# Patient Record
Sex: FEMALE | Race: BLACK OR AFRICAN AMERICAN
[De-identification: names, ages, dates, MRNs, and addresses within clinical notes are randomized per-mention and may not be internally consistent; named-entity substitution may affect disease eponyms.]

---

## 2017-02-07 NOTE — PHYS DOC
Past Medical History


Past Medical History:  Migraines


Additional Past Medical Histor:  [place] palsy


Past Surgical History:  No Surgical History


Alcohol Use:  None


Drug Use:  Marijuana





Adult General


Chief Complaint


Chief Complaint:  Toothache





HPI


HPI


Patient is a 21  year old female who presents the emergency Department today 

with complaint of right-sided dental pain is been ongoing for approximately 5 

days. Patient does have a history of impacted wisdom teeth. She had been 

advised to have them removed. She did not get them removed due to pregnancy. 

Patient denies antibiotic use or recent dental procedures within the past 30 

days.





Review of Systems


Review of Systems





Constitutional: Denies fever or chills []


Eyes: Denies change in visual acuity, redness, or eye pain []


HENT: Denies nasal congestion or sore throat []


Respiratory: Denies cough or shortness of breath []


Cardiovascular: No additional information not addressed in HPI []


GI: Denies abdominal pain, nausea, vomiting, bloody stools or diarrhea []


: Denies dysuria or hematuria []


Musculoskeletal: Denies back pain or joint pain []


Integument: Denies rash or skin lesions []


Neurologic: Denies headache, focal weakness or sensory changes []


Endocrine: Denies polyuria or polydipsia []





Allergies


Allergies





 Allergies








Coded Allergies Type Severity Reaction Last Updated Verified


 


  No Known Drug Allergies    2/20/14 No











Physical Exam


Physical Exam





Constitutional: Well developed, well nourished, no acute distress, non-toxic 

appearance. []


HENT: Normocephalic, atraumatic, bilateral external ears normal, oropharynx 

moist, no oral exudates, nose normal. There is no trismus. Patient has a 

partially erupted right mandibular wisdom tooth. There is minimal gingival 

swelling without purulent drainage or gingival abscess.


Eyes: PERRLA, EOMI, conjunctiva normal, no discharge. [] 


Neck: Normal range of motion, no tenderness, supple, no stridor. [] 


Cardiovascular:Heart rate regular rhythm, no murmur []


Lungs & Thorax:  Bilateral breath sounds clear to auscultation []


Abdomen: Bowel sounds normal, soft, no tenderness, no masses, no pulsatile 

masses. [] 


Skin: Warm, dry, no erythema, no rash. [] 


Back: No tenderness, no CVA tenderness. [] 


Extremities: No tenderness, no cyanosis, no clubbing, ROM intact, no edema. [] 


Neurologic: Alert and oriented X 3, normal motor function, normal sensory 

function, no focal deficits noted. []


Psychologic: Affect normal, judgement normal, mood normal. []





Current Patient Data


Vital Signs





 Vital Signs








  Date Time  Temp Pulse Resp B/P Pulse Ox O2 Delivery O2 Flow Rate FiO2


 


2/7/17 11:31 98.1 90 18  99 Room Air  





 98.1       











EKG


EKG


[]





Radiology/Procedures


Radiology/Procedures


[]





Course & Med Decision Making


Course & Med Decision Making


Pertinent Labs and Imaging studies reviewed. (See chart for details)





[]





Dragon Disclaimer


Dragon Disclaimer


This electronic medical record was generated, in whole or in part, using a 

voice recognition dictation system.





Departure


Departure


Impression:  


 Primary Impression:  


 Pericoronitis


 Additional Impression:  


 Pain, dental


Disposition:  01 HOME, SELF-CARE


Condition:  GOOD


Referrals:  


NO PCP (PCP)


Patient Instructions:  Dental Pain, Easy-to-Read, Pericoronitis





Additional Instructions:


1. Take the medication as prescribed.


2. You need to follow-up with a dentist. Use the dental resource sheet provided 

for assistance in finding a dental clinic to address your dental care needs.


3. Review the discharge instructions for self-care and reasons to return to the 

emergency department.


Scripts


Hydrocodone/Apap 5-325 (Norco 5-325 Tablet)1 Each Tablet1 Tab PO PRN Q6HRS PRN 

PAIN #10 TAB  Ref 0


   Prov:DOLORES RATLIFF         2/7/17


Amoxicillin 500 Mg Tablet1 Tab PO TID #30 TAB


   Prov:DOLORES RATLIFF         2/7/17





Problem Qualifiers








DOLORES RATLIFF Feb 7, 2017 13:02

## 2017-03-25 NOTE — VNOTE
CALL BACK NOTE


CALL BACK





Microbiology


3/23/17 Wet Prep - Final, Complete


          


3/23/17 Urine Culture - Final, Complete


          


3/23/17 Urine Culture Result 1 (AUDRA) - Final, Complete


          


3/23/17 Urine Culture Result 2 (AUDRA) - Final, Complete


          


Attempted to contact patient in regards to urine being positive for group B 

strep. Patient is not pregnant at 1-2 provide this information for her to the 

future. There was no answer at the number provided by registration. Message was 

left for her to call the department for results.








RICKY MÉNDEZ Mar 25, 2017 14:48

## 2017-03-27 NOTE — VNOTE
CALL BACK NOTE


CALL BACK





Microbiology


3/23/17 Wet Prep - Final, Complete


          


3/23/17 Urine Culture - Final, Complete


          


3/23/17 Urine Culture Result 1 (AUDRA) - Final, Complete


          


3/23/17 Urine Culture Result 2 (AUDRA) - Final, Complete


          


Patient returned our phone call for lab results at 0923 today. Her urine was 

positive for group B strep with less than 100,000 CFU per mL. She is not 

pregnant, so this is not a significant finding. Her wet prep was also positive 

for decreased. She was not treated for this in the emergency department or with 

prescriptions. She uses the [facility] pharmacy at [facility] and [address] [place] [address]. A 

prescription was called for Diflucan 150 mg by mouth weekly, #2 pills. All the 

patient's questions were answered to her satisfaction.








PRERNA MAJOR Mar 27, 2017 16:20

## 2017-09-30 VITALS — DIASTOLIC BLOOD PRESSURE: 70 MMHG | SYSTOLIC BLOOD PRESSURE: 146 MMHG

## 2017-09-30 NOTE — RAD
Obstetrical ultrasound less than 14 weeks transabdominal and transvaginal 

imaging.

 

HISTORY: Pelvic pain

 

Transabdominal ultrasound was performed. There is an intrauterine 

gestation. 3.3 x 3 x 2 cm cyst in the left ovary suggesting a corpus 

lutein. There is flow in the left ovary with color imaging. Right ovary 

was not definitively identified transabdominally.

 

Transvaginal imaging was performed for further evaluation. There is a 

trace of free fluid in the cul-de-sac. There is an intrauterine gestation,

the crown-rump length was 3 mm corresponding to 5 weeks 6 days gestational

age. Heart rate was 106 bpm. Yolk sac was noted at 1.3 mm.

 

Right ovary was normal measuring 2.5 x 2.8 x 1.4 cm. There is a simple 

left ovarian cyst measuring 3.4 x 2.9 x 3.1 cm. The left ovary measured 

3.8 x 4.6 x 3 cm.

 

IMPRESSION:

1. Viable intrauterine pregnancy 5 weeks 6 days gestational age by 

ultrasound with an estimated date of delivery of 5/27/2018.

2. Left ovarian cyst.

 

Electronically signed by: Parveen Singh MD (9/30/2017 9:19 PM) [facility]

## 2017-09-30 NOTE — PHYS DOC
Past Medical History


Past Medical History:  Migraines


Additional Past Medical Histor:  [place] palsy


Past Surgical History:  No Surgical History


Alcohol Use:  None


Drug Use:  Marijuana





Adult General


Chief Complaint


Chief Complaint:  ABDOMINAL PAIN





HPI


HPI





Patient is a 22  year old female with no significant medical history who 

presents today with nausea and generalized abdominal pain for the last 1 week. 

Patient denies any vomiting. She states she has missed her cycle by one week. 

She believes she is pregnant because she has been trying to be pregnant. 

Patient denies any unusual vaginal discharge. Denies any concerns for STDs.





Review of Systems


Review of Systems





Constitutional: Denies fever or chills []


Eyes: Denies change in visual acuity, redness, or eye pain []


HENT: Denies nasal congestion or sore throat []


Respiratory: Denies cough or shortness of breath []


Cardiovascular: No additional information not addressed in HPI []


GI: Generalized abdominal pain with nausea, denies any vomiting or diarrhea


: Denies dysuria or hematuria []


Musculoskeletal: Denies back pain or joint pain []


Integument: Denies rash or skin lesions []


Neurologic: Denies headache, focal weakness or sensory changes []





Current Medications


Current Medications





Current Medications








 Medications


  (Trade)  Dose


 Ordered  Sig/Devan  Start Time


 Stop Time Status Last Admin


Dose Admin


 


 Ondansetron HCl


  (Zofran)  4 mg  1X  ONCE  17 18:30


 17 18:31 DC 17 18:37


4 MG


 


 Sodium Chloride  1,000 ml @ 


 1,000 mls/hr  1X  ONCE  17 18:30


 17 19:29 DC 17 18:37


1,000 MLS/HR











Allergies


Allergies





Allergies








Coded Allergies Type Severity Reaction Last Updated Verified


 


  No Known Drug Allergies    14 No











Physical Exam


Physical Exam





Constitutional: Well developed, well nourished, no acute distress, non-toxic 

appearance. []


HENT: Normocephalic, atraumatic, bilateral external ears normal, oropharynx 

moist, no oral exudates, nose normal. []


Eyes: PERRLA, EOMI, conjunctiva normal, no discharge. [] 


Neck: Normal range of motion, no tenderness, supple, no stridor. [] 


Cardiovascular:Heart rate regular rhythm, no murmur []


Lungs & Thorax:  Bilateral breath sounds clear to auscultation []


Abdomen: Bowel sounds normal, soft, no tenderness, no masses, no pulsatile 

masses. [] 


Skin: Warm, dry, no erythema, no rash. [] 


Back: No tenderness, no CVA tenderness. [] 


Extremities: No tenderness, no cyanosis, no clubbing, ROM intact, no edema. [] 


Neurologic: Alert and oriented X 3, normal motor function, normal sensory 

function, no focal deficits noted. []


Psychologic: Affect normal, judgement normal, mood normal. []





Current Patient Data


Vital Signs





 Vital Signs








  Date Time  Temp Pulse Resp B/P (MAP) Pulse Ox O2 Delivery O2 Flow Rate FiO2


 


17 20:30  86 18 152/74 (100) 99 Room Air  


 


17 17:55 98.2       





 98.2       








Lab Values





 Laboratory Tests








Test


  17


18:08 17


18:10


 


POC Urine HCG, Qualitative


  Hcg positive


(Negative) 


 


 


White Blood Count


  


  6.2 x10^3/uL


(4.0-11.0)


 


Red Blood Count


  


  4.77 x10^6/uL


(3.50-5.40)


 


Hemoglobin


  


  13.6 g/dL


(12.0-15.5)


 


Hematocrit


  


  41.6 %


(36.0-47.0)


 


Mean Corpuscular Volume


  


  87 fL ()


 


 


Mean Corpuscular Hemoglobin  29 pg (25-35)  


 


Mean Corpuscular Hemoglobin


Concent 


  33 g/dL


(31-37)


 


Red Cell Distribution Width


  


  13.7 %


(11.5-14.5)


 


Platelet Count


  


  247 x10^3/uL


(140-400)


 


Neutrophils (%) (Auto)  50 % (31-73)  


 


Lymphocytes (%) (Auto)  38 % (24-48)  


 


Monocytes (%) (Auto)  9 % (0-9)  


 


Eosinophils (%) (Auto)  2 % (0-3)  


 


Basophils (%) (Auto)  1 % (0-3)  


 


Neutrophils # (Auto)


  


  3.1 x10^3uL


(1.8-7.7)


 


Lymphocytes # (Auto)


  


  2.4 x10^3/uL


(1.0-4.8)


 


Monocytes # (Auto)


  


  0.6 x10^3/uL


(0.0-1.1)


 


Eosinophils # (Auto)


  


  0.1 x10^3/uL


(0.0-0.7)


 


Basophils # (Auto)


  


  0.0 x10^3/uL


(0.0-0.2)


 


Urine Collection Type  Unknown  


 


Urine Color  Yellow  


 


Urine Clarity  Clear  


 


Urine pH  5.5  


 


Urine Specific Gravity  1.020  


 


Urine Protein


  


  Negative mg/dL


(NEG-TRACE)


 


Urine Glucose (UA)


  


  Negative mg/dL


(NEG)


 


Urine Ketones (Stick)


  


  15 mg/dL (NEG)


 


 


Urine Blood


  


  Negative (NEG)


 


 


Urine Nitrite


  


  Negative (NEG)


 


 


Urine Bilirubin


  


  Negative (NEG)


 


 


Urine Urobilinogen Dipstick


  


  0.2 mg/dL (0.2


mg/dL)


 


Urine Leukocyte Esterase


  


  Negative (NEG)


 


 


Urine RBC  0 /HPF (0-2)  


 


Urine WBC


  


  1-4 /HPF (0-4)


 


 


Urine Squamous Epithelial


Cells 


  Mod /LPF  


 


 


Urine Bacteria


  


  0 /HPF (0-FEW)


 


 


Urine Mucus  Mod /LPF  


 


Maternal Serum HCG Beta


Subunit 


  8391 mIU/mL


(0-5)  H


 


Sodium Level


  


  136 mmol/L


(136-145)


 


Potassium Level


  


  3.3 mmol/L


(3.5-5.1)  L


 


Chloride Level


  


  101 mmol/L


()


 


Carbon Dioxide Level


  


  26 mmol/L


(21-32)


 


Anion Gap  9 (6-14)  


 


Blood Urea Nitrogen


  


  8 mg/dL (7-20)


 


 


Creatinine


  


  0.8 mg/dL


(0.6-1.0)


 


Estimated GFR


(Cockcroft-Gault) 


  109.6  


 


 


BUN/Creatinine Ratio  10 (6-20)  


 


Glucose Level


  


  79 mg/dL


(70-99)


 


Calcium Level


  


  8.9 mg/dL


(8.5-10.1)


 


Total Bilirubin


  


  0.6 mg/dL


(0.2-1.0)


 


Aspartate Amino Transferase


(AST) 


  20 U/L (15-37)


 


 


Alanine Aminotransferase (ALT)


  


  23 U/L (14-59)


 


 


Alkaline Phosphatase


  


  66 U/L


()


 


Total Protein


  


  7.7 g/dL


(6.4-8.2)


 


Albumin


  


  3.6 g/dL


(3.4-5.0)


 


Albumin/Globulin Ratio


  


  0.9 (1.0-1.7)


L


 


Lipase


  


  118 U/L


()





 Laboratory Tests


17 18:10








 Laboratory Tests


17 18:10














EKG


EKG


[]





Radiology/Procedures


Radiology/Procedures


[]





Course & Med Decision Making


Course & Med Decision Making


Pertinent Labs and Imaging studies reviewed. (See chart for details)





This is a 22-year-old female patient presented today with generalized abdominal 

pain and nausea for one week. She is concerned she could be pregnant, she has 

been trying to be pregnant. She has missed her cycle by one week. Urine 

analysis is negative for infection.





Positive urine hCG. Beta hCG 8391. CBC CMP lipase with no acute findings. OB 

ultrasound positive for IUP with the station or age of 5 weeks 6 days. She is a 

 2 para 1





Patient was discharged with instructions to follow-up with an OB/GYN as soon as 

she can. Discharged with Compazine as needed for nausea vomiting. Provided 

return precautions and discharged in stable condition. Prenatal vitamins 

recommended.





Dragon Disclaimer


Dragon Disclaimer


This electronic medical record was generated, in whole or in part, using a 

voice recognition dictation system.





Departure


Departure


Impression:  


 Primary Impression:  


 Abdominal pain affecting pregnancy


 Additional Impressions:  


 Pregnancy


 Morning sickness


Disposition:   HOME, SELF-CARE


Condition:  STABLE


Referrals:  


NO PCP (PCP)








SEAN TEMPLE Jr, MD


follow up next week


Patient Instructions:  ABCs of Pregnancy, Abdominal Pain During Pregnancy, 

Morning Sickness, Easy-to-Read





Additional Instructions:  


Congratulations you are pregnant. Please take prenatal vitamins every day. 

Follow-up with an OB/GYN as soon as you can. Come back to the ED at any point 

symptoms worsen.


Scripts


Prochlorperazine Maleate (Compazine) 10 Mg Tablet


10 MG PO Q8HRS for NAUSEA, #30 TAB


   Prov: KERRY KABA         17





Problem Qualifiers








 Additional Impressions:  


 Pregnancy


 Weeks of gestation:  less than 8 weeks  Qualified Codes:  Z3A.01 - Less than 8 

weeks gestation of pregnancy








KERRY KABA Sep 30, 2017 18:22

## 2017-11-08 ENCOUNTER — HOSPITAL ENCOUNTER (OUTPATIENT)
Dept: HOSPITAL 61 - US | Age: 22
Discharge: HOME | End: 2017-11-08
Attending: OBSTETRICS & GYNECOLOGY
Payer: COMMERCIAL

## 2017-11-08 DIAGNOSIS — Z3A.09: ICD-10-CM

## 2017-11-08 DIAGNOSIS — O20.0: ICD-10-CM

## 2017-11-08 DIAGNOSIS — O09.91: Primary | ICD-10-CM

## 2017-11-08 PROCEDURE — 76817 TRANSVAGINAL US OBSTETRIC: CPT

## 2017-11-08 PROCEDURE — 76801 OB US < 14 WKS SINGLE FETUS: CPT

## 2017-11-08 NOTE — RAD
EXAM: Obstructive ultrasound.



HISTORY: No fetal heart tones.



COMPARISON: None.



FINDINGS: Sonographic evaluation of the uterus and fetus was performed

transabdominally and transvaginally.



The uterus is anteverted and measures 11.0 x 7.7 x 6.4 cm. There is a single

intrauterine gestation measuring 9 weeks 0 days. No fetal heart motion is

detected consistent with fetal demise. There is no subchorionic collection.

The cervix is closed.



The right ovary measures 2.7 x 2.4 x 1.3 cm. The left measures 4.0 x 3.1 x 1.7

cm. There is normal flow bilaterally.



IMPRESSION:

1. No fetal heart motion consistent with fetal demise.



These findings were communicated to Dr. Castillo by the technologist at the time

of the study.

## 2017-11-14 ENCOUNTER — HOSPITAL ENCOUNTER (EMERGENCY)
Dept: HOSPITAL 61 - ER | Age: 22
LOS: 1 days | Discharge: HOME | End: 2017-11-15
Payer: COMMERCIAL

## 2017-11-14 VITALS — WEIGHT: 228 LBS | HEIGHT: 62 IN | BODY MASS INDEX: 41.96 KG/M2

## 2017-11-14 DIAGNOSIS — O03.9: Primary | ICD-10-CM

## 2017-11-14 DIAGNOSIS — O24.911: ICD-10-CM

## 2017-11-14 DIAGNOSIS — O16.1: ICD-10-CM

## 2017-11-14 LAB
ANION GAP SERPL CALC-SCNC: 7 MMOL/L (ref 6–14)
BASOPHILS # BLD AUTO: 0 X10^3/UL (ref 0–0.2)
BASOPHILS NFR BLD: 0 % (ref 0–3)
BUN SERPL-MCNC: 9 MG/DL (ref 7–20)
BUN/CREAT SERPL: 11 (ref 6–20)
CALCIUM SERPL-MCNC: 8.8 MG/DL (ref 8.5–10.1)
CHLORIDE SERPL-SCNC: 106 MMOL/L (ref 98–107)
CO2 SERPL-SCNC: 28 MMOL/L (ref 21–32)
CREAT SERPL-MCNC: 0.8 MG/DL (ref 0.6–1)
EOSINOPHIL NFR BLD: 2 % (ref 0–3)
ERYTHROCYTE [DISTWIDTH] IN BLOOD BY AUTOMATED COUNT: 13.6 % (ref 11.5–14.5)
GFR SERPLBLD BASED ON 1.73 SQ M-ARVRAT: 109.6 ML/MIN
GLUCOSE SERPL-MCNC: 85 MG/DL (ref 70–99)
HCT VFR BLD CALC: 38.8 % (ref 36–47)
HGB BLD-MCNC: 12.9 G/DL (ref 12–15.5)
LYMPHOCYTES # BLD: 2 X10^3/UL (ref 1–4.8)
LYMPHOCYTES NFR BLD AUTO: 27 % (ref 24–48)
MCH RBC QN AUTO: 29 PG (ref 25–35)
MCHC RBC AUTO-ENTMCNC: 33 G/DL (ref 31–37)
MCV RBC AUTO: 87 FL (ref 79–100)
MONOCYTES NFR BLD: 11 % (ref 0–9)
NEUTROPHILS NFR BLD AUTO: 60 % (ref 31–73)
PLATELET # BLD AUTO: 249 X10^3/UL (ref 140–400)
POTASSIUM SERPL-SCNC: 4.2 MMOL/L (ref 3.5–5.1)
RBC # BLD AUTO: 4.45 X10^6/UL (ref 3.5–5.4)
SODIUM SERPL-SCNC: 141 MMOL/L (ref 136–145)
WBC # BLD AUTO: 7.5 X10^3/UL (ref 4–11)

## 2017-11-14 PROCEDURE — 84702 CHORIONIC GONADOTROPIN TEST: CPT

## 2017-11-14 PROCEDURE — 80053 COMPREHEN METABOLIC PANEL: CPT

## 2017-11-14 PROCEDURE — 87591 N.GONORRHOEAE DNA AMP PROB: CPT

## 2017-11-14 PROCEDURE — 87086 URINE CULTURE/COLONY COUNT: CPT

## 2017-11-14 PROCEDURE — 76801 OB US < 14 WKS SINGLE FETUS: CPT

## 2017-11-14 PROCEDURE — 96375 TX/PRO/DX INJ NEW DRUG ADDON: CPT

## 2017-11-14 PROCEDURE — 36415 COLL VENOUS BLD VENIPUNCTURE: CPT

## 2017-11-14 PROCEDURE — 96374 THER/PROPH/DIAG INJ IV PUSH: CPT

## 2017-11-14 PROCEDURE — 87491 CHLMYD TRACH DNA AMP PROBE: CPT

## 2017-11-14 PROCEDURE — 81001 URINALYSIS AUTO W/SCOPE: CPT

## 2017-11-14 PROCEDURE — 96361 HYDRATE IV INFUSION ADD-ON: CPT

## 2017-11-14 PROCEDURE — 85025 COMPLETE CBC W/AUTO DIFF WBC: CPT

## 2017-11-14 PROCEDURE — 74177 CT ABD & PELVIS W/CONTRAST: CPT

## 2017-11-14 PROCEDURE — 99285 EMERGENCY DEPT VISIT HI MDM: CPT

## 2017-11-15 VITALS — SYSTOLIC BLOOD PRESSURE: 122 MMHG | DIASTOLIC BLOOD PRESSURE: 66 MMHG

## 2017-11-15 LAB
ALBUMIN SERPL-MCNC: 3.2 G/DL (ref 3.4–5)
ALBUMIN/GLOB SERPL: 0.8 {RATIO} (ref 1–1.7)
ALP SERPL-CCNC: 72 U/L (ref 46–116)
ALT SERPL-CCNC: 28 U/L (ref 14–59)
AST SERPL-CCNC: 26 U/L (ref 15–37)
BACTERIA #/AREA URNS HPF: (no result) /HPF
BILIRUB SERPL-MCNC: 0.5 MG/DL (ref 0.2–1)
BILIRUB UR QL STRIP: NEGATIVE
GLOBULIN SER-MCNC: 3.9 G/DL (ref 2.2–3.8)
GLUCOSE UR STRIP-MCNC: NEGATIVE MG/DL
NITRITE UR QL STRIP: NEGATIVE
PH UR STRIP: 6 [PH]
PROT SERPL-MCNC: 7.1 G/DL (ref 6.4–8.2)
PROT UR STRIP-MCNC: 30 MG/DL
RBC #/AREA URNS HPF: (no result) /HPF (ref 0–2)
SP GR UR STRIP: >=1.03
SQUAMOUS #/AREA URNS LPF: (no result) /LPF
UROBILINOGEN UR-MCNC: 0.2 MG/DL
WBC #/AREA URNS HPF: (no result) /HPF (ref 0–4)

## 2017-11-15 NOTE — PHYS DOC
Past Medical History


Past Medical History:  Other


Additional Past Medical Histor:  Bell's Palsy, HTN DURING PREGNANCY


Past Surgical History:  Other


Additional Past Surgical Histo:  D&C


Alcohol Use:  None


Drug Use:  None





Adult General


Chief Complaint


Chief Complaint:  VAGINAL BLEEDING





HPI


HPI





Patient is a 22  year old female who is  2 para 1 presents here today 

secondary to vaginal bleeding and abdominal cramping. Patient denies any fevers 

shakes chills nausea vomiting or diarrhea. Patient complaining of excessive 

vaginal bleeding and abdominal cramping. Patient reports she had a DNE on 

Friday and was told that if she started having increased bleeding or pain to 

come to the ER. Patient reports this evening started noticing increased 

bleeding that has gone through 2 pads over the last several hours. Patient has 

any cough. Patient has a dysuria frequency or urgency. Patient has any diabetes 

liver lung or kidney problems. Patient's last normal period. 2017.





Review of systems:


Constitutional: Denies fever or chills 


Eyes: Denies change in visual acuity, redness, or eye pain 


HENT: Denies nasal congestion or sore throat 





All other systems were reviewed and found to be within normal limits, except as 

documented in this note.





Physical exam:


Constitutional: Well developed, well nourished, no acute distress, non-toxic 

appearance. 


HENT: Normocephalic, atraumatic, bilateral external ears normal


Eyes: PERRLA, EOMI, conjunctiva normal, no discharge.  


Neck: Normal range of motion, no tenderness, supple, no stridor.  


Cardiovascular:Heart rate regular rhythm


Lungs & Thorax:  Bilateral breath sounds clear to auscultation 


Abdomen: Bowel sounds normal, soft, no tenderness, no masses, no pulsatile 

masses.  


Skin: Warm, dry, no erythema, no rash.  


Back: No tenderness, no CVA tenderness.  


Extremities: No tenderness, no cyanosis, no clubbing, ROM intact, no edema.  


Neurologic: Alert and oriented X 3, normal motor function, normal sensory 

function, no focal deficits noted. 


Psychologic: Affect normal, judgement normal, mood normal. 


Pelvic exam reveals blood cervical fault. Os is closed. No cervical motion 

tenderness. No adnexal masses. No excessive amount of bleeding noted from the 

cervix.








Assessment and plan:





Ultrasound reveals no acute pathology. No evidence of retained products of 

conception. There is evidence of blood within the endometrium.


CAT scan revealed no acute pathology. Appendix appeared normal. Enlarged 

endometrium with what appears to be blood within the endometrium.





22year-old female who presents here today with vaginal bleeding likely 

secondary to her recent miscarriage. Patient's clinically hemodynamically 

stable. Patient had a CBC and a CMP done which were all within normal limits. 

Patient's ultrasound and CT scan did not reveal any acute pathology. Patient's 

pelvic exam was unremarkable. Patient was given adequate analgesia and 

hydration and feels much better. Patient be discharged home with instructions 

follow-up with her OB/GYN doctor in the morning for reevaluation and 

reassessment.





Current Medications


Current Medications





Current Medications








 Medications


  (Trade)  Dose


 Ordered  Sig/Devan  Start Time


 Stop Time Status Last Admin


Dose Admin


 


 Hydromorphone HCl


  (Dilaudid)  0.5 mg  1X  ONCE  17 23:00


 17 23:01 DC 17 22:54


0.5 MG


 


 Info


  (Do NOT chart on


 this entry -- for


 MONITORING)  1 each  PRN DAILY  PRN  17 23:00


 17 22:59   


 


 


 Iohexol


  (Omnipaque 300


 Mg/ml)  75 ml  1X  ONCE  17 23:00


 17 23:01 DC 17 00:15


75 ML


 


 Ketorolac


 Tromethamine


  (Toradol)  15 mg  1X  ONCE  17 23:00


 17 23:01 DC 17 22:54


15 MG


 


 Ondansetron HCl


  (Zofran)  4 mg  1X  ONCE  17 23:00


 17 23:01 DC 17 22:54


4 MG


 


 Sodium Chloride  1,000 ml @ 


 1,000 mls/hr  1X  ONCE  17 23:00


 17 23:59 DC 17 22:55


1,000 MLS/HR











Allergies


Allergies





Allergies








Coded Allergies Type Severity Reaction Last Updated Verified


 


  No Known Drug Allergies    17 No











Current Patient Data


Vital Signs





 Vital Signs








  Date Time  Temp Pulse Resp B/P (MAP) Pulse Ox O2 Delivery O2 Flow Rate FiO2


 


11/15/17 00:30  85 22 122/66 (84) 97 Room Air  


 


17 22:20 98.9       





 98.9       








Lab Values





 Laboratory Tests








Test


  17


23:23 11/15/17


00:25


 


White Blood Count


  7.5 x10^3/uL


(4.0-11.0) 


 


 


Red Blood Count


  4.45 x10^6/uL


(3.50-5.40) 


 


 


Hemoglobin


  12.9 g/dL


(12.0-15.5) 


 


 


Hematocrit


  38.8 %


(36.0-47.0) 


 


 


Mean Corpuscular Volume


  87 fL ()


  


 


 


Mean Corpuscular Hemoglobin 29 pg (25-35)   


 


Mean Corpuscular Hemoglobin


Concent 33 g/dL


(31-37) 


 


 


Red Cell Distribution Width


  13.6 %


(11.5-14.5) 


 


 


Platelet Count


  249 x10^3/uL


(140-400) 


 


 


Neutrophils (%) (Auto) 60 % (31-73)   


 


Lymphocytes (%) (Auto) 27 % (24-48)   


 


Monocytes (%) (Auto) 11 % (0-9)  H 


 


Eosinophils (%) (Auto) 2 % (0-3)   


 


Basophils (%) (Auto) 0 % (0-3)   


 


Neutrophils # (Auto)


  4.5 x10^3uL


(1.8-7.7) 


 


 


Lymphocytes # (Auto)


  2.0 x10^3/uL


(1.0-4.8) 


 


 


Monocytes # (Auto)


  0.8 x10^3/uL


(0.0-1.1) 


 


 


Eosinophils # (Auto)


  0.2 x10^3/uL


(0.0-0.7) 


 


 


Basophils # (Auto)


  0.0 x10^3/uL


(0.0-0.2) 


 


 


Maternal Serum HCG Beta


Subunit 82 mIU/mL


(0-5)  H 


 


 


Sodium Level


  141 mmol/L


(136-145) 


 


 


Potassium Level


  4.2 mmol/L


(3.5-5.1) 


 


 


Chloride Level


  106 mmol/L


() 


 


 


Carbon Dioxide Level


  28 mmol/L


(21-32) 


 


 


Anion Gap 7 (6-14)   


 


Blood Urea Nitrogen


  9 mg/dL (7-20)


  


 


 


Creatinine


  0.8 mg/dL


(0.6-1.0) 


 


 


Estimated GFR


(Cockcroft-Gault) 109.6  


  


 


 


BUN/Creatinine Ratio 11 (6-20)   


 


Glucose Level


  85 mg/dL


(70-99) 


 


 


Calcium Level


  8.8 mg/dL


(8.5-10.1) 


 


 


Total Bilirubin


  0.5 mg/dL


(0.2-1.0) 


 


 


Aspartate Amino Transferase


(AST) 26 U/L (15-37)


  


 


 


Alanine Aminotransferase (ALT)


  28 U/L (14-59)


  


 


 


Alkaline Phosphatase


  72 U/L


() 


 


 


Total Protein


  7.1 g/dL


(6.4-8.2) 


 


 


Albumin


  3.2 g/dL


(3.4-5.0)  L 


 


 


Albumin/Globulin Ratio


  0.8 (1.0-1.7)


L 


 


 


Urine Collection Type  Unknown  


 


Urine Color  Red  


 


Urine Clarity  Turbid  


 


Urine pH  6.0  


 


Urine Specific Gravity  >=1.030  


 


Urine Protein


  


  30 mg/dL


(NEG-TRACE)


 


Urine Glucose (UA)


  


  Negative mg/dL


(NEG)


 


Urine Ketones (Stick)


  


  Trace mg/dL


(NEG)


 


Urine Blood  Large (NEG)  


 


Urine Nitrite


  


  Negative (NEG)


 


 


Urine Bilirubin


  


  Negative (NEG)


 


 


Urine Urobilinogen Dipstick


  


  0.2 mg/dL (0.2


mg/dL)


 


Urine Leukocyte Esterase  Small (NEG)  


 


Urine RBC


  


  Tntc /HPF


(0-2)


 


Urine WBC


  


  5-10 /HPF


(0-4)


 


Urine Squamous Epithelial


Cells 


  Mod /LPF  


 


 


Urine Bacteria


  


  Few /HPF


(0-FEW)


 


Urine Mucus  Mod /LPF  





 Laboratory Tests


17 23:23








 Laboratory Tests


17 23:23











Microbiology


11/15/17 Wet Prep - Final, Complete


           








EKG


EKG


[]





Radiology/Procedures


Radiology/Procedures


[]





Course & Med Decision Making


Course & Med Decision Making


Pertinent Labs and Imaging studies reviewed. (See chart for details)





[]





Dragon Disclaimer


Dragon Disclaimer


This electronic medical record was generated, in whole or in part, using a 

voice recognition dictation system.





Departure


Departure


Impression:  


 Primary Impression:  


 Vaginal bleeding


 Additional Impression:  


 Miscarriage


Disposition:  01 HOME, SELF-CARE


Condition:  IMPROVED


Referrals:  


NO PCP (PCP)


Patient Instructions:  Dilation and Curettage or Vacuum Curettage, Care After


Scripts


Tramadol Hcl (ULTRAM) 50 Mg Tablet


1 TAB PO Q6HRS, #14 TAB


   Prov: ANNABELLE PERSON MD         11/15/17 


Ibuprofen (IBUPROFEN) 600 Mg Tablet


600 MG PO PRN Q6HRS Y for PAIN, #20 TAB


   Prov: ANNABELLE PERSON MD         11/15/17





Problem Qualifiers











ANNABELLE PERSON MD Nov 15, 2017 01:34

## 2017-11-15 NOTE — RAD
PQRS Compliance Statement:

 

One or more of the following individualized dose reduction techniques were

utilized for this examination:  

1. Automated exposure control  

2. Adjustment of the mA and/or kV according to patient size  

3. Use of iterative reconstruction technique

 

CT abdomen/pelvis with contrast November 14, 2017

 

INDICATION: Vaginal bleeding after D&C.

 

COMPARISON: None available

 

TECHNIQUE: Multiple axial CT images of the abdomen and pelvis were 

obtained after the intravenous administration of 75 cc Omnipaque 300. 

Coronal and sagittal reformats are provided.

 

FINDINGS:

 

Lung bases are clear. Heart size is within normal limits.

 

The liver is homogeneous in enhancement without evidence for a focal mass 

lesion. The spleen is nonenlarged. Bilateral adrenal glands are normal in 

appearance. Pancreas is normal. Gallbladder is present without adjacent 

inflammatory changes.

 

Abdominal aorta is normal in course and caliber. There are no 

pathologically enlarged lymph nodes in abdomen or pelvis. There is no free

fluid or free intraperitoneal air.

 

Kidneys enhance symmetrically. No suspicious renal mass is identified. 

There is no hydronephrosis. No calculi are identified in the ureters or 

urinary bladder. Urinary bladder is within normal limits given degree of 

distention.

 

Small and large bowel are normal in caliber without evidence for bowel 

obstruction. No pericolonic inflammatory changes are identified. The 

appendix is nonenlarged measuring 5 mm in diameter. No adjacent 

inflammatory changes are present.

 

Endometrium appears thickened measuring approximately 3.5 cm. Adnexa 

appear normal. Trace free fluid within the pelvis may be physiologic.

 

No suspicious osseous lesions are identified.

 

IMPRESSION:

1. No evidence for bowel obstruction or inflammation. A nondilated 

appendix is noted without significant adjacent inflammatory changes.

2. Endometrium appears thickened measuring up to 3.5 cm. Findings are 

nonspecific, however consideration may be given for blood products. Pelvic

ultrasound may be of benefit to assess for any vascularity to suggest 

underlying arteriovenous malformation.

 

Electronically signed by: Merced Quintero MD (11/15/2017 12:27 AM) 

[facility]

## 2017-11-15 NOTE — RAD
Obstetric pelvic ultrasound November 15, 2017

 

INDICATION: History of fetal demise 11/8/2017 status post D&C on 

11/10/2017. Beta-hCG 82

 

COMPARISON: CT abdomen/pelvis November 14, 2017

 

TECHNIQUE: Sonographic imaging of the pelvis was performed utilizing 

transabdominal imaging.

 

FINDINGS: 

 

The uterus measures 10 x 7.2 x 6.3 cm. The endometrium contains hypoechoic

debris measuring approximately 4.3 cm in transverse dimension. No 

suspicious vascularity is identified within the endometrium.

 

The right ovary measures 2.9 x 1.6 x 1.5 cm. Arterial and venous waveform 

is noted at the time of imaging. The left ovary measures 1.8 x 3.6 x 1.8 

cm. Arterial and venous waveform is noted at the time of imaging. Ovaries 

are normal in appearance.

 

There is no free fluid within the pelvis.

 

IMPRESSION:

 

Distended endometrium containing debris most suggestive of blood products.

Findings are less likely to represent retained products of conception 

given the lack of vascularity, however persistent elevation of beta hCG 

remains suspicious. Short-term follow-up pelvic ultrasound and serial beta

hCG is recommended. There is no definite evidence for a vascular 

malformation.

 

Electronically signed by: Merced Quintero MD (11/15/2017 1:17 AM) 

[facility]

## 2018-01-13 ENCOUNTER — HOSPITAL ENCOUNTER (EMERGENCY)
Dept: HOSPITAL 61 - ER | Age: 23
Discharge: HOME | End: 2018-01-13
Payer: COMMERCIAL

## 2018-01-13 DIAGNOSIS — O99.351: ICD-10-CM

## 2018-01-13 DIAGNOSIS — O99.511: Primary | ICD-10-CM

## 2018-01-13 DIAGNOSIS — G51.0: ICD-10-CM

## 2018-01-13 DIAGNOSIS — J02.9: ICD-10-CM

## 2018-01-13 DIAGNOSIS — Z3A.01: ICD-10-CM

## 2018-01-13 PROCEDURE — 99283 EMERGENCY DEPT VISIT LOW MDM: CPT

## 2018-01-26 ENCOUNTER — HOSPITAL ENCOUNTER (EMERGENCY)
Dept: HOSPITAL 61 - ER | Age: 23
Discharge: HOME | End: 2018-01-26
Payer: COMMERCIAL

## 2018-01-26 DIAGNOSIS — Z3A.11: ICD-10-CM

## 2018-01-26 DIAGNOSIS — R19.7: ICD-10-CM

## 2018-01-26 DIAGNOSIS — O21.9: ICD-10-CM

## 2018-01-26 DIAGNOSIS — O20.0: Primary | ICD-10-CM

## 2018-01-26 DIAGNOSIS — O23.41: ICD-10-CM

## 2018-01-26 LAB
% ATYL: 1 % (ref 0–0)
% BANDS: 3 % (ref 0–9)
% LYMPHS: 4 % (ref 24–48)
% MONOS: 6 % (ref 0–10)
% SEGS: 86 % (ref 35–66)
ADD MAN DIFF?: YES
ALBUMIN SERPL-MCNC: 3.7 G/DL (ref 3.4–5)
ALBUMIN/GLOB SERPL: 0.8 {RATIO} (ref 1–1.7)
ALP SERPL-CCNC: 66 U/L (ref 46–116)
ALT (SGPT): 18 U/L (ref 14–59)
AMORPHOUS SEDIMENT,UR: PRESENT /HPF
ANION GAP SERPL CALC-SCNC: 12 MMOL/L (ref 6–14)
AST SERPL-CCNC: 16 U/L (ref 15–37)
BACTERIA,URINE: (no result) /HPF
BASO #: 0 X10^3/UL (ref 0–0.2)
BASO %: 0 % (ref 0–3)
BILIRUBIN,URINE: (no result)
BLOOD UREA NITROGEN: 7 MG/DL (ref 7–20)
BUN/CREAT SERPL: 10 (ref 6–20)
CALCIUM: 9.5 MG/DL (ref 8.5–10.1)
CHLORIDE: 97 MMOL/L (ref 98–107)
CLARITY,URINE: (no result)
CO2 SERPL-SCNC: 24 MMOL/L (ref 21–32)
COLOR,URINE: (no result)
CREAT SERPL-MCNC: 0.7 MG/DL (ref 0.6–1)
EOS #: 0 X10^3/UL (ref 0–0.7)
EOS %: 0 % (ref 0–3)
GFR SERPLBLD BASED ON 1.73 SQ M-ARVRAT: 127.8 ML/MIN
GLOBULIN SER-MCNC: 4.7 G/DL (ref 2.2–3.8)
GLUCOSE SERPL-MCNC: 108 MG/DL (ref 70–99)
GLUCOSE,URINE: NEGATIVE MG/DL
HCG SERPL-ACNC: 7.4 X10^3/UL (ref 4–11)
HEMATOCRIT: 44.3 % (ref 36–47)
HEMOGLOBIN: 15.3 G/DL (ref 12–15.5)
LYMPH #: 0.3 X10^3/UL (ref 1–4.8)
LYMPH %: 5 % (ref 24–48)
MEAN CORPUSCULAR HEMOGLOBIN: 29 PG (ref 25–35)
MEAN CORPUSCULAR HGB CONC: 35 G/DL (ref 31–37)
MEAN CORPUSCULAR VOLUME: 85 FL (ref 79–100)
MONO #: 0.3 X10^3/UL (ref 0–1.1)
MONO %: 4 % (ref 0–9)
NEUT #: 6.7 X10^3UL (ref 1.8–7.7)
NEUT %: 91 % (ref 31–73)
NITRITE,URINE: NEGATIVE
PH,URINE: 5.5
PLATELET COUNT: 290 X10^3/UL (ref 140–400)
PLT ESTIMATE: ADEQUATE
POTASSIUM SERPL-SCNC: 3.9 MMOL/L (ref 3.5–5.1)
PROTEIN,URINE: 30 MG/DL
RED BLOOD COUNT: 5.19 X10^6/UL (ref 3.5–5.4)
RED CELL DISTRIBUTION WIDTH: 13.2 % (ref 11.5–14.5)
SODIUM: 133 MMOL/L (ref 136–145)
SPECIFIC GRAVITY,URINE: >=1.03
SQUAMOUS EPITHELIAL CELL,UR: (no result) /LPF
TOTAL BILIRUBIN: 1.3 MG/DL (ref 0.2–1)
TOTAL PROTEIN: 8.4 G/DL (ref 6.4–8.2)
URINE HCG POC: (no result)
UROBILINOGEN,URINE: 1 MG/DL
WBC,URINE: (no result) /HPF (ref 0–4)

## 2018-01-26 PROCEDURE — 85025 COMPLETE CBC W/AUTO DIFF WBC: CPT

## 2018-01-26 PROCEDURE — 81025 URINE PREGNANCY TEST: CPT

## 2018-01-26 PROCEDURE — 99285 EMERGENCY DEPT VISIT HI MDM: CPT

## 2018-01-26 PROCEDURE — 76801 OB US < 14 WKS SINGLE FETUS: CPT

## 2018-01-26 PROCEDURE — 36415 COLL VENOUS BLD VENIPUNCTURE: CPT

## 2018-01-26 PROCEDURE — 80053 COMPREHEN METABOLIC PANEL: CPT

## 2018-01-26 PROCEDURE — 96374 THER/PROPH/DIAG INJ IV PUSH: CPT

## 2018-01-26 PROCEDURE — 85007 BL SMEAR W/DIFF WBC COUNT: CPT

## 2018-01-26 PROCEDURE — 87086 URINE CULTURE/COLONY COUNT: CPT

## 2018-01-26 PROCEDURE — 81001 URINALYSIS AUTO W/SCOPE: CPT

## 2018-01-26 PROCEDURE — 84702 CHORIONIC GONADOTROPIN TEST: CPT

## 2018-01-26 PROCEDURE — 96361 HYDRATE IV INFUSION ADD-ON: CPT

## 2018-01-26 RX ADMIN — ONDANSETRON 1 MG: 2 INJECTION INTRAMUSCULAR; INTRAVENOUS at 13:15

## 2018-01-26 RX ADMIN — BACITRACIN 1 MLS/HR: 5000 INJECTION, POWDER, FOR SOLUTION INTRAMUSCULAR at 13:15

## 2018-02-06 ENCOUNTER — HOSPITAL ENCOUNTER (EMERGENCY)
Dept: HOSPITAL 61 - ER | Age: 23
Discharge: HOME | End: 2018-02-06
Payer: COMMERCIAL

## 2018-02-06 DIAGNOSIS — O16.2: ICD-10-CM

## 2018-02-06 DIAGNOSIS — Z3A.20: ICD-10-CM

## 2018-02-06 DIAGNOSIS — O46.92: Primary | ICD-10-CM

## 2018-02-06 DIAGNOSIS — T82.898A: ICD-10-CM

## 2018-02-06 LAB
ADD MAN DIFF?: NO
BACTERIA #/AREA URNS HPF: (no result) /HPF
BASO #: 0 X10^3/UL (ref 0–0.2)
BASO %: 1 % (ref 0–3)
BILIRUBIN,URINE: NEGATIVE
CLARITY,URINE: CLEAR
COLOR,URINE: YELLOW
EOS #: 0.1 X10^3/UL (ref 0–0.7)
EOS %: 2 % (ref 0–3)
GLUCOSE,URINE: NEGATIVE MG/DL
HCG SERPL-ACNC: 6.2 X10^3/UL (ref 4–11)
HEMATOCRIT: 39.8 % (ref 36–47)
HEMOGLOBIN: 13.5 G/DL (ref 12–15.5)
LYMPH #: 2.1 X10^3/UL (ref 1–4.8)
LYMPH %: 34 % (ref 24–48)
MEAN CORPUSCULAR HEMOGLOBIN: 29 PG (ref 25–35)
MEAN CORPUSCULAR HGB CONC: 34 G/DL (ref 31–37)
MEAN CORPUSCULAR VOLUME: 86 FL (ref 79–100)
MONO #: 0.4 X10^3/UL (ref 0–1.1)
MONO %: 7 % (ref 0–9)
NEUT #: 3.6 X10^3UL (ref 1.8–7.7)
NEUT %: 57 % (ref 31–73)
NITRITE UR QL STRIP: NEGATIVE
PH,URINE: 5.5
PLATELET COUNT: 235 X10^3/UL (ref 140–400)
PROTEIN,URINE: NEGATIVE MG/DL
RBC,URINE: 0 /HPF (ref 0–2)
RED BLOOD COUNT: 4.65 X10^6/UL (ref 3.5–5.4)
RED CELL DISTRIBUTION WIDTH: 13.3 % (ref 11.5–14.5)
SPECIFIC GRAVITY,URINE: >=1.03
SQUAMOUS EPITHELIAL CELL,UR: (no result) /LPF
UROBILINOGEN,URINE: 0.2 MG/DL
WBC #/AREA URNS HPF: (no result) /HPF (ref 0–4)

## 2018-02-06 PROCEDURE — 99284 EMERGENCY DEPT VISIT MOD MDM: CPT

## 2018-02-06 PROCEDURE — 85025 COMPLETE CBC W/AUTO DIFF WBC: CPT

## 2018-02-06 PROCEDURE — 36415 COLL VENOUS BLD VENIPUNCTURE: CPT

## 2018-02-06 PROCEDURE — 84702 CHORIONIC GONADOTROPIN TEST: CPT

## 2018-02-06 PROCEDURE — 81001 URINALYSIS AUTO W/SCOPE: CPT

## 2018-03-30 ENCOUNTER — HOSPITAL ENCOUNTER (OUTPATIENT)
Dept: HOSPITAL 61 - US | Age: 23
Discharge: HOME | End: 2018-03-30
Attending: OBSTETRICS & GYNECOLOGY
Payer: COMMERCIAL

## 2018-03-30 DIAGNOSIS — O26.842: ICD-10-CM

## 2018-03-30 DIAGNOSIS — O26.842: Primary | ICD-10-CM

## 2018-03-30 DIAGNOSIS — Z3A.19: ICD-10-CM

## 2018-03-30 DIAGNOSIS — O09.92: ICD-10-CM

## 2018-03-30 DIAGNOSIS — O09.92: Primary | ICD-10-CM

## 2018-03-30 PROCEDURE — 76805 OB US >/= 14 WKS SNGL FETUS: CPT

## 2018-07-23 ENCOUNTER — HOSPITAL ENCOUNTER (INPATIENT)
Dept: HOSPITAL 61 - 3 SO LND | Age: 23
LOS: 6 days | Discharge: HOME | End: 2018-07-29
Attending: OBSTETRICS & GYNECOLOGY | Admitting: OBSTETRICS & GYNECOLOGY
Payer: COMMERCIAL

## 2018-07-23 DIAGNOSIS — O45.93: Primary | ICD-10-CM

## 2018-07-23 DIAGNOSIS — Z3A.35: ICD-10-CM

## 2018-07-23 DIAGNOSIS — O61.9: ICD-10-CM

## 2018-07-23 LAB
ADD MAN DIFF?: NO
ALBUMIN SERPL-MCNC: 2.6 G/DL (ref 3.4–5)
ALBUMIN/GLOB SERPL: 0.7 {RATIO} (ref 1–1.7)
ALP SERPL-CCNC: 115 U/L (ref 46–116)
ALT (SGPT): 18 U/L (ref 14–59)
ANION GAP SERPL CALC-SCNC: 10 MMOL/L (ref 6–14)
AST SERPL-CCNC: 15 U/L (ref 15–37)
BACTERIA,URINE: 0 /HPF
BASO #: 0 X10^3/UL (ref 0–0.2)
BASO %: 0 % (ref 0–3)
BILIRUBIN,URINE: NEGATIVE
BLOOD UREA NITROGEN: 7 MG/DL (ref 7–20)
BUN/CREAT SERPL: 10 (ref 6–20)
CALCIUM: 8.5 MG/DL (ref 8.5–10.1)
CHLORIDE: 105 MMOL/L (ref 98–107)
CLARITY,URINE: CLEAR
CO2 SERPL-SCNC: 22 MMOL/L (ref 21–32)
COLOR,URINE: YELLOW
CREAT SERPL-MCNC: 0.7 MG/DL (ref 0.6–1)
EOS #: 0.1 X10^3/UL (ref 0–0.7)
EOS %: 2 % (ref 0–3)
GFR SERPLBLD BASED ON 1.73 SQ M-ARVRAT: 126.6 ML/MIN
GLOBULIN SER-MCNC: 4 G/DL (ref 2.2–3.8)
GLUCOSE SERPL-MCNC: 68 MG/DL (ref 70–99)
GLUCOSE,URINE: NEGATIVE MG/DL
HCG SERPL-ACNC: 7.4 X10^3/UL (ref 4–11)
HEMATOCRIT: 37.5 % (ref 36–47)
HEMOGLOBIN: 12.6 G/DL (ref 12–15.5)
LYMPH #: 2.2 X10^3/UL (ref 1–4.8)
LYMPH %: 30 % (ref 24–48)
MEAN CORPUSCULAR HEMOGLOBIN: 30 PG (ref 25–35)
MEAN CORPUSCULAR HGB CONC: 34 G/DL (ref 31–37)
MEAN CORPUSCULAR VOLUME: 89 FL (ref 79–100)
MONO #: 0.6 X10^3/UL (ref 0–1.1)
MONO %: 8 % (ref 0–9)
NEUT #: 4.5 X10^3UL (ref 1.8–7.7)
NEUT %: 60 % (ref 31–73)
NITRITE,URINE: NEGATIVE
PH,URINE: 6.5
PLATELET COUNT: 154 X10^3/UL (ref 140–400)
POTASSIUM SERPL-SCNC: 3.7 MMOL/L (ref 3.5–5.1)
PROTEIN,URINE: NEGATIVE MG/DL
RBC,URINE: 0 /HPF (ref 0–2)
RED BLOOD COUNT: 4.23 X10^6/UL (ref 3.5–5.4)
RED CELL DISTRIBUTION WIDTH: 14 % (ref 11.5–14.5)
SODIUM: 137 MMOL/L (ref 136–145)
SPECIFIC GRAVITY,URINE: 1.01
SQUAMOUS EPITHELIAL CELL,UR: (no result) /LPF
TOTAL BILIRUBIN: 0.4 MG/DL (ref 0.2–1)
TOTAL PROTEIN: 6.6 G/DL (ref 6.4–8.2)
URIC ACID: 5.2 MG/DL (ref 2.6–6)
UROBILINOGEN,URINE: 0.2 MG/DL
WBC,URINE: 0 /HPF (ref 0–4)

## 2018-07-23 PROCEDURE — 80053 COMPREHEN METABOLIC PANEL: CPT

## 2018-07-23 PROCEDURE — 88307 TISSUE EXAM BY PATHOLOGIST: CPT

## 2018-07-23 PROCEDURE — 86850 RBC ANTIBODY SCREEN: CPT

## 2018-07-23 PROCEDURE — 86901 BLOOD TYPING SEROLOGIC RH(D): CPT

## 2018-07-23 PROCEDURE — 85025 COMPLETE CBC W/AUTO DIFF WBC: CPT

## 2018-07-23 PROCEDURE — 85007 BL SMEAR W/DIFF WBC COUNT: CPT

## 2018-07-23 PROCEDURE — 36415 COLL VENOUS BLD VENIPUNCTURE: CPT

## 2018-07-23 PROCEDURE — 86900 BLOOD TYPING SEROLOGIC ABO: CPT

## 2018-07-23 PROCEDURE — 76817 TRANSVAGINAL US OBSTETRIC: CPT

## 2018-07-23 PROCEDURE — 84550 ASSAY OF BLOOD/URIC ACID: CPT

## 2018-07-23 PROCEDURE — 81001 URINALYSIS AUTO W/SCOPE: CPT

## 2018-07-23 PROCEDURE — 76805 OB US >/= 14 WKS SNGL FETUS: CPT

## 2018-07-23 RX ADMIN — MAGNESIUM SULFATE IN WATER 1 MLS/HR: 40 INJECTION, SOLUTION INTRAVENOUS at 17:40

## 2018-07-23 RX ADMIN — DINOPROSTONE 1 MG: 10 INSERT VAGINAL at 18:01

## 2018-07-23 RX ADMIN — ACETAMINOPHEN 1 MG: 325 TABLET, FILM COATED ORAL at 17:40

## 2018-07-23 RX ADMIN — MAGNESIUM SULFATE IN WATER 1 MLS/HR: 40 INJECTION, SOLUTION INTRAVENOUS at 18:01

## 2018-07-23 RX ADMIN — SODIUM CHLORIDE, SODIUM LACTATE, POTASSIUM CHLORIDE, AND CALCIUM CHLORIDE 1 MLS/HR: .6; .31; .03; .02 INJECTION, SOLUTION INTRAVENOUS at 17:40

## 2018-07-23 RX ADMIN — HYDRALAZINE HYDROCHLORIDE 1 MG: 20 INJECTION INTRAMUSCULAR; INTRAVENOUS at 18:08

## 2018-07-23 RX ADMIN — Medication 1 MLS/HR: at 17:41

## 2018-07-24 LAB
ADD MAN DIFF?: NO
ALBUMIN SERPL-MCNC: 2.5 G/DL (ref 3.4–5)
ALBUMIN/GLOB SERPL: 0.6 {RATIO} (ref 1–1.7)
ALP SERPL-CCNC: 119 U/L (ref 46–116)
ALT (SGPT): 19 U/L (ref 14–59)
ANION GAP SERPL CALC-SCNC: 9 MMOL/L (ref 6–14)
AST SERPL-CCNC: 18 U/L (ref 15–37)
BASO #: 0 X10^3/UL (ref 0–0.2)
BASO %: 1 % (ref 0–3)
BLOOD UREA NITROGEN: 7 MG/DL (ref 7–20)
BUN/CREAT SERPL: 10 (ref 6–20)
CALCIUM: 7.9 MG/DL (ref 8.5–10.1)
CHLORIDE: 104 MMOL/L (ref 98–107)
CO2 SERPL-SCNC: 22 MMOL/L (ref 21–32)
CREAT SERPL-MCNC: 0.7 MG/DL (ref 0.6–1)
EOS #: 0.1 X10^3/UL (ref 0–0.7)
EOS %: 1 % (ref 0–3)
GFR SERPLBLD BASED ON 1.73 SQ M-ARVRAT: 126.6 ML/MIN
GLOBULIN SER-MCNC: 4.2 G/DL (ref 2.2–3.8)
GLUCOSE SERPL-MCNC: 75 MG/DL (ref 70–99)
HCG SERPL-ACNC: 7.9 X10^3/UL (ref 4–11)
HEMATOCRIT: 41.3 % (ref 36–47)
HEMOGLOBIN: 14.1 G/DL (ref 12–15.5)
LYMPH #: 2.1 X10^3/UL (ref 1–4.8)
LYMPH %: 27 % (ref 24–48)
MEAN CORPUSCULAR HEMOGLOBIN: 30 PG (ref 25–35)
MEAN CORPUSCULAR HGB CONC: 34 G/DL (ref 31–37)
MEAN CORPUSCULAR VOLUME: 88 FL (ref 79–100)
MONO #: 0.8 X10^3/UL (ref 0–1.1)
MONO %: 10 % (ref 0–9)
NEUT #: 4.8 X10^3UL (ref 1.8–7.7)
NEUT %: 61 % (ref 31–73)
PLATELET COUNT: 160 X10^3/UL (ref 140–400)
POTASSIUM SERPL-SCNC: 3.9 MMOL/L (ref 3.5–5.1)
RED BLOOD COUNT: 4.7 X10^6/UL (ref 3.5–5.4)
RED CELL DISTRIBUTION WIDTH: 14.1 % (ref 11.5–14.5)
SODIUM: 135 MMOL/L (ref 136–145)
TOTAL BILIRUBIN: 0.4 MG/DL (ref 0.2–1)
TOTAL PROTEIN: 6.7 G/DL (ref 6.4–8.2)
URIC ACID: 5.4 MG/DL (ref 2.6–6)

## 2018-07-24 RX ADMIN — HYDRALAZINE HYDROCHLORIDE 1 MG: 20 INJECTION INTRAMUSCULAR; INTRAVENOUS at 14:08

## 2018-07-24 RX ADMIN — MAGNESIUM SULFATE IN WATER 1 MLS/HR: 40 INJECTION, SOLUTION INTRAVENOUS at 15:31

## 2018-07-24 RX ADMIN — MAGNESIUM SULFATE IN WATER 1 MLS/HR: 40 INJECTION, SOLUTION INTRAVENOUS at 05:17

## 2018-07-24 RX ADMIN — HYDRALAZINE HYDROCHLORIDE 1 MG: 20 INJECTION INTRAMUSCULAR; INTRAVENOUS at 02:38

## 2018-07-24 RX ADMIN — BETAMETHASONE SODIUM PHOSPHATE AND BETAMETHASONE ACETATE 1 MG: 3; 3 INJECTION, SUSPENSION INTRA-ARTICULAR; INTRALESIONAL; INTRAMUSCULAR at 10:56

## 2018-07-24 RX ADMIN — DINOPROSTONE 1 MG: 10 INSERT VAGINAL at 10:55

## 2018-07-24 RX ADMIN — ACETAMINOPHEN 1 MG: 325 TABLET, FILM COATED ORAL at 01:00

## 2018-07-24 RX ADMIN — ACETAMINOPHEN 1 MG: 325 TABLET, FILM COATED ORAL at 16:51

## 2018-07-24 RX ADMIN — ACETAMINOPHEN 1 MG: 325 TABLET, FILM COATED ORAL at 08:05

## 2018-07-24 RX ADMIN — HYDRALAZINE HYDROCHLORIDE 1 MG: 20 INJECTION INTRAMUSCULAR; INTRAVENOUS at 08:23

## 2018-07-24 RX ADMIN — SODIUM CHLORIDE, SODIUM LACTATE, POTASSIUM CHLORIDE, AND CALCIUM CHLORIDE 1 MLS/HR: .6; .31; .03; .02 INJECTION, SOLUTION INTRAVENOUS at 08:05

## 2018-07-24 RX ADMIN — HYDRALAZINE HYDROCHLORIDE 1 MG: 20 INJECTION INTRAMUSCULAR; INTRAVENOUS at 12:07

## 2018-07-25 RX ADMIN — METHYLERGONOVINE MALEATE 1 MG: 0.2 INJECTION INTRAVENOUS at 14:30

## 2018-07-25 RX ADMIN — CARBOPROST TROMETHAMINE 1 MCG: 250 INJECTION, SOLUTION INTRAMUSCULAR at 14:30

## 2018-07-25 RX ADMIN — CEFAZOLIN 1 GM: 330 INJECTION, POWDER, FOR SOLUTION INTRAMUSCULAR; INTRAVENOUS at 22:26

## 2018-07-25 RX ADMIN — SODIUM CHLORIDE, SODIUM LACTATE, POTASSIUM CHLORIDE, AND CALCIUM CHLORIDE 1 MLS/HR: .6; .31; .03; .02 INJECTION, SOLUTION INTRAVENOUS at 18:49

## 2018-07-25 RX ADMIN — BETAMETHASONE SODIUM PHOSPHATE AND BETAMETHASONE ACETATE 1 MG: 3; 3 INJECTION, SUSPENSION INTRA-ARTICULAR; INTRALESIONAL; INTRAMUSCULAR at 10:30

## 2018-07-25 RX ADMIN — SODIUM CHLORIDE, SODIUM LACTATE, POTASSIUM CHLORIDE, AND CALCIUM CHLORIDE 1 MLS/HR: .6; .31; .03; .02 INJECTION, SOLUTION INTRAVENOUS at 05:59

## 2018-07-25 RX ADMIN — MAGNESIUM SULFATE IN WATER 1 MLS/HR: 40 INJECTION, SOLUTION INTRAVENOUS at 01:38

## 2018-07-25 RX ADMIN — SODIUM CITRATE AND CITRIC ACID MONOHYDRATE 1 ML: 500; 334 SOLUTION ORAL at 13:39

## 2018-07-25 RX ADMIN — MISOPROSTOL 1 MCG: 200 TABLET ORAL at 15:00

## 2018-07-25 RX ADMIN — DINOPROSTONE 1 MG: 10 INSERT VAGINAL at 00:20

## 2018-07-25 RX ADMIN — MAGNESIUM SULFATE IN WATER 1 MLS/HR: 40 INJECTION, SOLUTION INTRAVENOUS at 12:09

## 2018-07-25 RX ADMIN — MAGNESIUM SULFATE IN WATER 1 MLS/HR: 40 INJECTION, SOLUTION INTRAVENOUS at 22:32

## 2018-07-26 LAB
% LYMPHS: 13 % (ref 24–48)
% MONOS: 5 % (ref 0–10)
% SEGS: 82 % (ref 35–66)
ADD MAN DIFF?: YES
BASO #: 0 X10^3/UL (ref 0–0.2)
BASO %: 0 % (ref 0–3)
EOS #: 0 X10^3/UL (ref 0–0.7)
EOS %: 0 % (ref 0–3)
HCG SERPL-ACNC: 16.8 X10^3/UL (ref 4–11)
HEMATOCRIT: 40.1 % (ref 36–47)
HEMOGLOBIN: 13.6 G/DL (ref 12–15.5)
LYMPH #: 1.3 X10^3/UL (ref 1–4.8)
LYMPH %: 8 % (ref 24–48)
MEAN CORPUSCULAR HEMOGLOBIN: 30 PG (ref 25–35)
MEAN CORPUSCULAR HGB CONC: 34 G/DL (ref 31–37)
MEAN CORPUSCULAR VOLUME: 88 FL (ref 79–100)
MONO #: 1 X10^3/UL (ref 0–1.1)
MONO %: 6 % (ref 0–9)
NEUT #: 14.5 X10^3UL (ref 1.8–7.7)
NEUT %: 86 % (ref 31–73)
PLATELET COUNT: 197 X10^3/UL (ref 140–400)
PLT ESTIMATE: ADEQUATE
RED BLOOD COUNT: 4.56 X10^6/UL (ref 3.5–5.4)
RED CELL DISTRIBUTION WIDTH: 14.3 % (ref 11.5–14.5)

## 2018-07-26 RX ADMIN — LABETALOL HCL 1 MG: 100 TABLET, FILM COATED ORAL at 10:27

## 2018-07-26 RX ADMIN — MAGNESIUM SULFATE IN WATER 1 MLS/HR: 40 INJECTION, SOLUTION INTRAVENOUS at 08:41

## 2018-07-26 RX ADMIN — Medication 1 MG: at 08:42

## 2018-07-26 RX ADMIN — KETOROLAC TROMETHAMINE 1 MG: 30 INJECTION, SOLUTION INTRAMUSCULAR at 08:42

## 2018-07-26 RX ADMIN — OXYCODONE HYDROCHLORIDE AND ACETAMINOPHEN 1 TAB: 5; 325 TABLET ORAL at 17:31

## 2018-07-26 RX ADMIN — Medication 1 MG: at 17:30

## 2018-07-26 RX ADMIN — DIPHENHYDRAMINE HYDROCHLORIDE 1 MG: 12.5 SOLUTION ORAL at 02:02

## 2018-07-26 RX ADMIN — DOCUSATE SODIUM 1 MG: 100 CAPSULE, LIQUID FILLED ORAL at 08:42

## 2018-07-26 RX ADMIN — CEFAZOLIN 1 GM: 330 INJECTION, POWDER, FOR SOLUTION INTRAMUSCULAR; INTRAVENOUS at 14:40

## 2018-07-26 RX ADMIN — CEFAZOLIN 1 GM: 330 INJECTION, POWDER, FOR SOLUTION INTRAMUSCULAR; INTRAVENOUS at 06:08

## 2018-07-27 RX ADMIN — OXYCODONE HYDROCHLORIDE AND ACETAMINOPHEN 1 TAB: 5; 325 TABLET ORAL at 19:19

## 2018-07-27 RX ADMIN — OXYCODONE HYDROCHLORIDE AND ACETAMINOPHEN 1 TAB: 5; 325 TABLET ORAL at 00:04

## 2018-07-27 RX ADMIN — LABETALOL HCL 1 MG: 100 TABLET, FILM COATED ORAL at 08:05

## 2018-07-27 RX ADMIN — OXYCODONE HYDROCHLORIDE AND ACETAMINOPHEN 1 TAB: 5; 325 TABLET ORAL at 07:13

## 2018-07-27 RX ADMIN — IBUPROFEN 1 MG: 800 TABLET ORAL at 07:13

## 2018-07-27 RX ADMIN — IBUPROFEN 1 MG: 800 TABLET ORAL at 00:03

## 2018-07-27 RX ADMIN — IBUPROFEN 1 MG: 800 TABLET ORAL at 18:22

## 2018-07-28 RX ADMIN — OXYCODONE HYDROCHLORIDE AND ACETAMINOPHEN 1 TAB: 5; 325 TABLET ORAL at 02:27

## 2018-07-28 RX ADMIN — IBUPROFEN 1 MG: 800 TABLET ORAL at 02:26

## 2018-07-28 RX ADMIN — OXYCODONE HYDROCHLORIDE AND ACETAMINOPHEN 1 TAB: 5; 325 TABLET ORAL at 17:23

## 2018-07-28 RX ADMIN — DOCUSATE SODIUM 1 MG: 100 CAPSULE, LIQUID FILLED ORAL at 08:30

## 2018-07-28 RX ADMIN — IBUPROFEN 1 MG: 800 TABLET ORAL at 13:47

## 2018-07-28 RX ADMIN — OXYCODONE HYDROCHLORIDE AND ACETAMINOPHEN 1 TAB: 5; 325 TABLET ORAL at 08:30

## 2018-07-28 RX ADMIN — LABETALOL HCL 1 MG: 100 TABLET, FILM COATED ORAL at 08:30

## 2018-07-29 RX ADMIN — OXYCODONE HYDROCHLORIDE AND ACETAMINOPHEN 1 TAB: 5; 325 TABLET ORAL at 14:41

## 2018-07-29 RX ADMIN — IBUPROFEN 1 MG: 800 TABLET ORAL at 00:32

## 2018-07-29 RX ADMIN — OXYCODONE HYDROCHLORIDE AND ACETAMINOPHEN 1 TAB: 5; 325 TABLET ORAL at 06:32

## 2019-05-22 NOTE — PHYS DOC
Past Medical History


Past Medical History:  Migraines


Additional Past Medical Histor:  [place] palsy


Past Surgical History:  No Surgical History


Alcohol Use:  None


Drug Use:  Marijuana





Adult General


Chief Complaint


Chief Complaint:  VAGINAL PROBLEM





HPI


HPI


Patient is a 21  year old female who presents emergency Department today with 

complaint of vaginal discharge and dysuria that began 2 days ago. Patient 

denies any flank pain or fevers. Patient states that she may be concerned about 

STDs, but denies any different sexual partners and when that she's had. Patient 

does have a history of bacterial vaginosis. Patient denies antibiotic use in 

the past 90 days. She denies any testing for STDs within the past 90 days.


Of incidental note, patient complains about back pain which is chronic for her 

and headaches which she also has chronically. She is 21 years old but tends to 

have multiple complaints without objective findings.


Patient is currently on Depo-Provera.





Review of Systems


Review of Systems





Constitutional: Denies fever or chills []


Eyes: Denies change in visual acuity, redness, or eye pain []


HENT: Denies nasal congestion or sore throat []


Respiratory: Denies cough or shortness of breath []


Cardiovascular: No additional information not addressed in HPI []


GI: Denies abdominal pain, nausea, vomiting, bloody stools or diarrhea []


: Denies dysuria or hematuria []


Musculoskeletal: Denies back pain or joint pain []


Integument: Denies rash or skin lesions []


Neurologic: Denies headache, focal weakness or sensory changes []


Endocrine: Denies polyuria or polydipsia []





Current Medications


Current Medications








 Current Medications








 Medications


  (Trade)  Dose


 Ordered  Sig/Devan  Start Time


 Stop Time Status Last Admin


Dose Admin


 


 Azithromycin


  (Zithromax)  1,000 mg  1X  ONCE  3/23/17 12:45


 3/23/17 12:46 DC 3/23/17 13:24


1,000 MG


 


 Ceftriaxone Sodium


  (Rocephin Im)  250 mg  1X  ONCE  3/23/17 12:45


 3/23/17 12:46 DC 3/23/17 13:24


250 MG


 


 Metronidazole


  (Flagyl)  500 mg  1X  ONCE  3/23/17 12:45


 3/23/17 12:46 DC 3/23/17 13:23


500 MG














Allergies


Allergies





 Allergies








Coded Allergies Type Severity Reaction Last Updated Verified


 


  No Known Drug Allergies    2/20/14 No











Physical Exam


Physical Exam





Constitutional: Well developed, well nourished, no acute distress, non-toxic 

appearance. []


HENT: Normocephalic, atraumatic, bilateral external ears normal, oropharynx 

moist, no oral exudates, nose normal. []


Eyes: PERRLA, EOMI, conjunctiva normal, no discharge. [] 


Neck: Normal range of motion, no tenderness, supple, no stridor. [] 


Cardiovascular:Heart rate regular rhythm, no murmur []


Lungs & Thorax:  Bilateral breath sounds clear to auscultation []


Abdomen: Bowel sounds normal, soft, no tenderness, no masses, no pulsatile 

masses. Patient complains of suprapubic tenderness without rebound or guarding. 

Pelvic exam: Chaperone (Ama Herrera RN). External genitalia is without 

lesions. The introitus and vaginal canal are excoriated. Patient has copious 

amounts of mucopurulent drainage. There is no blood or clots. There is no 

cervical motion tenderness. Patient does complain of suprapubic pain. There are 

no palpable masses to the adnexa or uterus.


Skin: Warm, dry, no erythema, no rash. [] 


Back: No tenderness, no CVA tenderness. [] 


Extremities: No tenderness, no cyanosis, no clubbing, ROM intact, no edema. [] 


Neurologic: Alert and oriented X 3, normal motor function, normal sensory 

function, no focal deficits noted. []


Psychologic: Affect normal, judgement normal, mood normal. []





Current Patient Data


Vital Signs





 Vital Signs








  Date Time  Temp Pulse Resp B/P Pulse Ox O2 Delivery O2 Flow Rate FiO2


 


3/23/17 10:41 97.9 103 20 173/108 100 Room Air  





 97.9       








Lab Values





 Laboratory Tests








Test


  3/23/17


10:00 3/23/17


10:47


 


POC Urine HCG, Qualitative


  Hcg negative


(Negative) 


 


 


Urine Collection Type  Unknown  


 


Urine Color  Yellow  


 


Urine Clarity  Clear  


 


Urine pH  6.0  


 


Urine Specific Gravity  1.025  


 


Urine Protein


  


  Negativemg/dL


(NEG-TRACE)


 


Urine Glucose (UA)


  


  Negativemg/dL


(NEG)


 


Urine Ketones (Stick)


  


  Negativemg/dL


(NEG)


 


Urine Blood


  


  Negative (NEG)


 


 


Urine Nitrite


  


  Negative (NEG)


 


 


Urine Bilirubin


  


  Negative (NEG)


 


 


Urine Urobilinogen Dipstick


  


  1.0mg/dL (0.2


mg/dL)


 


Urine Leukocyte Esterase  Large (NEG)  


 


Urine RBC  3-5/HPF (0-2)  


 


Urine WBC


  


  11-20/HPF


(0-4)


 


Urine Squamous Epithelial


Cells 


  Mod/LPF  


 


 


Urine Bacteria


  


  Few/HPF


(0-FEW)


 


Urine Mucus  Slight/LPF  








Microbiology


3/23/17 Wet Prep - Final, Complete


          








EKG


EKG


[]





Radiology/Procedures


Radiology/Procedures


[]





Course & Med Decision Making


Course & Med Decision Making


Patient received 250 mg of Rocephin IM, 1 g of azithromycin by mouth and 500 mg 

of Flagyl by mouth here. There is no clinical evidence of genital herpes. 

Patient's examination is more consistent with an STD. Wet prep did not show any 

evidence of clue cells but did have altered floor.





Dragon Disclaimer


Dragon Disclaimer


This electronic medical record was generated, in whole or in part, using a 

voice recognition dictation system.





Departure


Departure


Impression:  


 Primary Impression:  


 Vaginitis


 Additional Impressions:  


 Back pain


 Headache


Disposition:  01 HOME, SELF-CARE


Condition:  GOOD


Referrals:  


NO PCP (PCP)


Patient Instructions:  Back Pain, Adult, Easy-to-Read, General Headache Without 

Cause, Easy-to-Read, Vaginitis, Easy-to-Read





Additional Instructions:


1. Pregnancy test and urine test today are negative.


2. You received Rocephin, azithromycin and Flagyl here in the emergency 

department. You need to continue taking Flagyl for the next 7 days.


3. You should not have sex for the next 2 weeks. Do not insert anything in your 

vagina. Do not douche.


3. Take the medication as prescribed.


4. Use the pamphlet provided for assistance in finding a primary care doctor in 

which you may follow-up within the next 10-14 days.


Scripts


Orphenadrine Citrate 100 Mg Tablet.er1 Tab PO BID muscle relaxer #20 TAB  Ref 1


   Prov:DOLORES RATLIFF         3/23/17


Butalb/Acetaminophen/Caffeine (Fioricet -40 Mg Capsule)1 Each Capsule1 

Each PO PRN Q4HRS PRN HEADACHE #20 CAP


   Prov:DOLORES RATLIFF         3/23/17


Metronidazole (Flagyl)500 Mg Tablet1 Tab PO BID #14 TAB


   Prov:DOLORES RATLIFF         3/23/17





Problem Qualifiers








DOLORES RATLIFF Mar 23, 2017 12:12
Detail Level: Detailed
Detail Level: Generalized
Detail Level: Zone

## 2019-06-09 ENCOUNTER — HOSPITAL ENCOUNTER (EMERGENCY)
Dept: HOSPITAL 61 - ER | Age: 24
Discharge: HOME | End: 2019-06-09
Payer: COMMERCIAL

## 2019-06-09 VITALS — SYSTOLIC BLOOD PRESSURE: 155 MMHG | DIASTOLIC BLOOD PRESSURE: 88 MMHG

## 2019-06-09 VITALS — WEIGHT: 263 LBS | BODY MASS INDEX: 48.4 KG/M2 | HEIGHT: 62 IN
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
## 2019-06-09 DIAGNOSIS — I10: ICD-10-CM
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
## 2019-06-09 DIAGNOSIS — S61.214A: Primary | ICD-10-CM
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
## 2019-06-09 PROCEDURE — 99283 EMERGENCY DEPT VISIT LOW MDM: CPT

## 2019-06-09 PROCEDURE — 12001 RPR S/N/AX/GEN/TRNK 2.5CM/<: CPT

## 2019-06-09 PROCEDURE — 90471 IMMUNIZATION ADMIN: CPT

## 2019-06-09 PROCEDURE — 90715 TDAP VACCINE 7 YRS/> IM: CPT

## 2019-06-09 NOTE — PHYS DOC
Past Medical History


Past Medical History:  Hypertension, Other


Additional Past Medical Histor:  Bell's Palsy, HTN DURING PREGNANCY


Past Surgical History:  Other


Additional Past Surgical Histo:  D&C


Alcohol Use:  None


Drug Use:  None





Adult General


Chief Complaint


Chief Complaint:  LACERATION/AVULSION





HPI


HPI





Patient is a 24  year old female who presents with right ring finger laceration,

patient states she was wiping a working table at work/Quick trip when she 

accidentally cut herself on the table. She is right-handed.





Review of Systems


Review of Systems





Constitutional: Denies fever or chills []


Musculoskeletal: Denies back pain or joint pain []


Integument: Right ring finger laceration


Neurologic: Denies headache, focal weakness or sensory changes []








All other systems were reviewed and found to be within normal limits, except as 

documented in this note.





Current Medications


Current Medications





Current Medications








 Medications


  (Trade)  Dose


 Ordered  Sig/Devan  Start Time


 Stop Time Status Last Admin


Dose Admin


 


 Diphtheria/


 Tetanus/Acell


 Pertussis


  (Boostrix)  0.5 ml  ONCE ONCE  6/9/19 23:45


 6/9/19 23:46   





 


 Lidocaine/Sodium


 Bicarbonate


  (Buffered


 Lidocaine 1%)  3 ml  1X  ONCE  6/9/19 23:45


 6/9/19 23:46   














Allergies


Allergies





Allergies








Coded Allergies Type Severity Reaction Last Updated Verified


 


  No Known Drug Allergies    11/9/17 No











Physical Exam


Physical Exam





Constitutional: Well developed, well nourished, no acute distress, non-toxic 

appearance. []


skin-lateral aspect distal end of the right ring finger with a laceration 

approximately 1 cm long, this no tendon involvement. Bleeding is not well 

controlled. Full range of motion to the right ring finger. Patient able to flex 

and extend the right ring finger at all the joints. Adequate ulnar sensation to 

the right ring finger. +2 right radial pulse. Cap refill less than 2 seconds the

 right ring finger.


Back: No tenderness, no CVA tenderness. [] 


Extremities: No tenderness, no cyanosis, no clubbing, ROM intact, no edema. [] 


Neurologic: Alert and oriented X 3, normal motor function, normal sensory 

function, no focal deficits noted. []


Psychologic: Affect normal, judgement normal, mood normal. []





Current Patient Data


Vital Signs





                                   Vital Signs








  Date Time  Temp Pulse Resp B/P (MAP) Pulse Ox O2 Delivery O2 Flow Rate FiO2


 


6/9/19 23:03 99.0 105 18 155/88 (110) 100 Room Air  





 99.0       











EKG


EKG


[]





Radiology/Procedures


Radiology/Procedures


Laceration/Wound Repair





   Wound Location: Right ring finger


   Wound's Depth, Shape: Horizontal


   Wound Length (cm): Approximately 1 cm


   Wound Explored:  clean


   Irrigated w/ Saline (ccs): 70ml


   Betadine Prep?: Yes


   Anesthesia: 1% buffered lidocaine


   Volume Anesthetic (ccs): Approximately 1 mL


   Wound Repaired With: Dissolvable got


   Suture Size/Type: 5.0/interrupted sutures


   Number of Sutures: 3


Progress  : Wound was covered with Band-Aid





Course & Med Decision Making


Course & Med Decision Making


Pertinent Labs and Imaging studies reviewed. (See chart for details)





This is a 24-year-old female patient with right ring finger laceration, la

ceration is superficial the bleeding was not well controlled, we attempted to 

put pressure, patient could not tolerate pressure on her finger. I offered to 

glue it patient refused. Finally we opted for suture repair which she accepted. 

Tetanus was given, wound was closed with 3 interrupted sutures as noted in 

procedures. Bleeding has stopped. Band-Aid applied. Discharged to home. Wound 

care instructions and return precautions provided. Tetanus updated.





Dragon Disclaimer


Dragon Disclaimer


This electronic medical record was generated, in whole or in part, using a voice

 recognition dictation system.





Departure


Departure


Impression:  


   Primary Impression:  


   Finger laceration


Disposition:  01 HOME, SELF-CARE


Condition:  STABLE


Referrals:  


NO PCP (PCP)


Follow-up with your doctor as needed


Patient Instructions:  Fingertip Laceration





Additional Instructions:  


You have laceration to the right ring finger that was closed with stitches, they

 will dissolve and disappear on their own. Apply Neosporin to the area twice a 

day starting tomorrow. Keep the area clean and dry.  You can remove the dressing

 after 24 hours if there is no bleeding or draining. Monitor the area for any 

signs of infection including but not limited to increased redness to the area, 

yellow drainage from the area, odorous drainage from the area and return to the 

ED if they occur or see your own doctor.





Problem Qualifiers








   Primary Impression:  


   Finger laceration


   Encounter type:  initial encounter  Finger:  ring finger  Damage to nail 

   status:  without damage  Foreign body presence:  without foreign body  

   Laterality:  right  Qualified Codes:  S61.214A - Laceration without foreign 

   body of right ring finger without damage to nail, initial encounter








KERRY KABA APRN               Jun 9, 2019 23:45

## 2019-08-14 ENCOUNTER — HOSPITAL ENCOUNTER (EMERGENCY)
Dept: HOSPITAL 61 - ER | Age: 24
LOS: 1 days | Discharge: HOME | End: 2019-08-15
Payer: SELF-PAY

## 2019-08-14 VITALS — HEIGHT: 62 IN | WEIGHT: 260 LBS | BODY MASS INDEX: 47.84 KG/M2

## 2019-08-14 DIAGNOSIS — O23.41: ICD-10-CM

## 2019-08-14 DIAGNOSIS — Z3A.01: ICD-10-CM

## 2019-08-14 DIAGNOSIS — O16.1: ICD-10-CM

## 2019-08-14 DIAGNOSIS — O34.81: Primary | ICD-10-CM

## 2019-08-14 DIAGNOSIS — N83.291: ICD-10-CM

## 2019-08-14 PROCEDURE — 85025 COMPLETE CBC W/AUTO DIFF WBC: CPT

## 2019-08-14 PROCEDURE — 81025 URINE PREGNANCY TEST: CPT

## 2019-08-14 PROCEDURE — 87591 N.GONORRHOEAE DNA AMP PROB: CPT

## 2019-08-14 PROCEDURE — 36415 COLL VENOUS BLD VENIPUNCTURE: CPT

## 2019-08-14 PROCEDURE — 87491 CHLMYD TRACH DNA AMP PROBE: CPT

## 2019-08-14 PROCEDURE — 96361 HYDRATE IV INFUSION ADD-ON: CPT

## 2019-08-14 PROCEDURE — 87086 URINE CULTURE/COLONY COUNT: CPT

## 2019-08-14 PROCEDURE — 76817 TRANSVAGINAL US OBSTETRIC: CPT

## 2019-08-14 PROCEDURE — 81001 URINALYSIS AUTO W/SCOPE: CPT

## 2019-08-14 PROCEDURE — 96375 TX/PRO/DX INJ NEW DRUG ADDON: CPT

## 2019-08-14 PROCEDURE — 84702 CHORIONIC GONADOTROPIN TEST: CPT

## 2019-08-14 PROCEDURE — 99285 EMERGENCY DEPT VISIT HI MDM: CPT

## 2019-08-14 PROCEDURE — 80053 COMPREHEN METABOLIC PANEL: CPT

## 2019-08-14 PROCEDURE — 96374 THER/PROPH/DIAG INJ IV PUSH: CPT

## 2019-08-15 VITALS — DIASTOLIC BLOOD PRESSURE: 74 MMHG | SYSTOLIC BLOOD PRESSURE: 138 MMHG

## 2019-08-15 LAB
ALBUMIN SERPL-MCNC: 3.8 G/DL (ref 3.4–5)
ALBUMIN/GLOB SERPL: 1 {RATIO} (ref 1–1.7)
ALP SERPL-CCNC: 68 U/L (ref 46–116)
ALT SERPL-CCNC: 19 U/L (ref 14–59)
ANION GAP SERPL CALC-SCNC: 12 MMOL/L (ref 6–14)
APTT PPP: YELLOW S
AST SERPL-CCNC: 21 U/L (ref 15–37)
BACTERIA #/AREA URNS HPF: 0 /HPF
BASOPHILS # BLD AUTO: 0.1 X10^3/UL (ref 0–0.2)
BASOPHILS NFR BLD: 1 % (ref 0–3)
BILIRUB SERPL-MCNC: 0.6 MG/DL (ref 0.2–1)
BILIRUB UR QL STRIP: NEGATIVE
BUN SERPL-MCNC: 6 MG/DL (ref 7–20)
BUN/CREAT SERPL: 8 (ref 6–20)
CALCIUM SERPL-MCNC: 9.3 MG/DL (ref 8.5–10.1)
CHLORIDE SERPL-SCNC: 102 MMOL/L (ref 98–107)
CO2 SERPL-SCNC: 21 MMOL/L (ref 21–32)
CREAT SERPL-MCNC: 0.8 MG/DL (ref 0.6–1)
EOSINOPHIL NFR BLD: 0.1 X10^3/UL (ref 0–0.7)
EOSINOPHIL NFR BLD: 1 % (ref 0–3)
ERYTHROCYTE [DISTWIDTH] IN BLOOD BY AUTOMATED COUNT: 14.3 % (ref 11.5–14.5)
FIBRINOGEN PPP-MCNC: CLEAR MG/DL
GFR SERPLBLD BASED ON 1.73 SQ M-ARVRAT: 107.6 ML/MIN
GLOBULIN SER-MCNC: 4 G/DL (ref 2.2–3.8)
GLUCOSE SERPL-MCNC: 77 MG/DL (ref 70–99)
HCT VFR BLD CALC: 39.9 % (ref 36–47)
HGB BLD-MCNC: 13.3 G/DL (ref 12–15.5)
LYMPHOCYTES # BLD: 1.7 X10^3/UL (ref 1–4.8)
LYMPHOCYTES NFR BLD AUTO: 23 % (ref 24–48)
MCH RBC QN AUTO: 28 PG (ref 25–35)
MCHC RBC AUTO-ENTMCNC: 33 G/DL (ref 31–37)
MCV RBC AUTO: 84 FL (ref 79–100)
MONO #: 0.4 X10^3/UL (ref 0–1.1)
MONOCYTES NFR BLD: 6 % (ref 0–9)
NEUT #: 5.3 X10^3/UL (ref 1.8–7.7)
NEUTROPHILS NFR BLD AUTO: 70 % (ref 31–73)
NITRITE UR QL STRIP: NEGATIVE
PH UR STRIP: 5.5 [PH]
PLATELET # BLD AUTO: 267 X10^3/UL (ref 140–400)
POTASSIUM SERPL-SCNC: 4 MMOL/L (ref 3.5–5.1)
PROT SERPL-MCNC: 7.8 G/DL (ref 6.4–8.2)
PROT UR STRIP-MCNC: NEGATIVE MG/DL
RBC # BLD AUTO: 4.74 X10^6/UL (ref 3.5–5.4)
RBC #/AREA URNS HPF: 0 /HPF (ref 0–2)
SODIUM SERPL-SCNC: 135 MMOL/L (ref 136–145)
SQUAMOUS #/AREA URNS LPF: (no result) /LPF
UROBILINOGEN UR-MCNC: 0.2 MG/DL
WBC # BLD AUTO: 7.6 X10^3/UL (ref 4–11)
WBC #/AREA URNS HPF: (no result) /HPF (ref 0–4)

## 2019-08-15 NOTE — PHYS DOC
Past Medical History


Past Medical History:  Hypertension, Other


Additional Past Medical Histor:  Bell's Palsy, HTN DURING PREGNANCY


Past Surgical History:  Other


Additional Past Surgical Histo:  D&C


Alcohol Use:  None


Drug Use:  None





Adult General


Chief Complaint


Chief Complaint:  ABDOMINAL PAIN IN PREGNANCY





HPI


HPI





Patient is a 24  year old female with history of hypertension  3 para 2 

currently  pregnant last menstrual cycle 2019 presenting to the ED 

today complaining 7 out of 10 pelvic pain bilaterally that began a couple 

minutes prior to coming to the ED. Patient states she had finished her shift at 

[employer] and was getting ready to leave when she developed this pain. She 

states she called her sister who brought her to the ED. Denies any vaginal bleed

ing, describes the pain as sharp and constant. Denies anything specifically 

exacerbating or relieving the pain.





Patient was seen in the ED yesterday for nausea and vomiting in pregnancy, she 

was diagnosed with UTI, she states she did not buy the medications because she 

has 2 children hence did not have time to get the medicines neither does she 

have money but got paid today. 





OB/GYN 





Review of Systems


Review of Systems





Constitutional: Denies fever or chills []


Eyes: Denies change in visual acuity, redness, or eye pain []


HENT: Denies nasal congestion or sore throat []


Respiratory: Denies cough or shortness of breath []


Cardiovascular: No additional information not addressed in HPI []


GI: Reports pelvic pain, denies nausea, vomiting, bloody stools or diarrhea []


: Denies dysuria or hematuria []


Musculoskeletal: Denies back pain or joint pain []


Integument: Denies rash or skin lesions []


Neurologic: Denies headache, focal weakness or sensory changes []








All other systems were reviewed and found to be within normal limits, except as 

documented in this note.





Current Medications


Current Medications





Current Medications








 Medications


  (Trade)  Dose


 Ordered  Sig/Devan  Start Time


 Stop Time Status Last Admin


Dose Admin


 


 Azithromycin


  (Zithromax)  1,000 mg  1X  ONCE  8/15/19 00:15


 8/15/19 00:16 DC 8/15/19 00:24


1,000 MG


 


 Ceftriaxone Sodium


  (Rocephin)  1 gm  1X  ONCE  8/15/19 00:15


 8/15/19 00:16 DC 8/15/19 00:24


1 GM


 


 Metronidazole


  (Flagyl)  2,000 mg  1X  ONCE  8/15/19 00:15


 8/15/19 00:16 DC 8/15/19 00:24


2,000 MG


 


 Ondansetron HCl


  (Zofran)  4 mg  1X  ONCE  8/15/19 00:15


 8/15/19 00:16 DC 8/15/19 00:24


4 MG


 


 Sodium Chloride  1,000 ml @ 


 1,000 mls/hr  1X  ONCE  8/15/19 00:15


 8/15/19 01:14  8/15/19 00:35


1,000 MLS/HR











Allergies


Allergies





Allergies








Coded Allergies Type Severity Reaction Last Updated Verified


 


  No Known Drug Allergies    17 No











Physical Exam


Physical Exam





Constitutional: Well developed, well nourished, no acute distress, non-toxic 

appearance. []


HENT: Normocephalic, atraumatic, bilateral external ears normal, oropharynx 

moist, no oral exudates, nose normal. []


Eyes: PERRLA, EOMI, conjunctiva normal, no discharge. [] 


Neck: Normal range of motion, no tenderness, supple, no stridor. [] 


Cardiovascular:Heart rate regular rhythm, no murmur []


Lungs & Thorax:  Bilateral breath sounds clear to auscultation []


Abdomen: Bowel sounds normal, soft,  no masses, no pulsatile masses. [] 


Pelvic exam


External pelvic appears normal, cervix is visualized, closed, positive CMT, no 

adnexal tenderness.


Skin: Warm, dry, no erythema, no rash. [] 


Back: No tenderness, no CVA tenderness. [] 


Extremities: No tenderness, no cyanosis, no clubbing, ROM intact, no edema. [] 


Neurologic: Alert and oriented X 3, normal motor function, normal sensory 

function, no focal deficits noted. []


Psychologic: Affect normal, judgement normal, mood normal. []





Current Patient Data


Vital Signs





                                   Vital Signs








  Date Time  Temp Pulse Resp B/P (MAP) Pulse Ox O2 Delivery O2 Flow Rate FiO2


 


19 23:30 98.9 76 18 168/115 (132) 100 Room Air  





 98.9       








Lab Values





                                Laboratory Tests








Test


 19


23:50 8/15/19


00:15


 


Urine Collection Type Unknown   


 


Urine Color Yellow   


 


Urine Clarity Clear   


 


Urine pH 5.5   


 


Urine Specific Gravity >=1.030   


 


Urine Protein


 Negative mg/dL


(NEG-TRACE) 





 


Urine Glucose (UA)


 Negative mg/dL


(NEG) 





 


Urine Ketones (Stick)


 15 mg/dL (NEG)


 





 


Urine Blood


 Negative (NEG)


 





 


Urine Nitrite


 Negative (NEG)


 





 


Urine Bilirubin


 Negative (NEG)


 





 


Urine Urobilinogen Dipstick


 0.2 mg/dL (0.2


mg/dL) 





 


Urine Leukocyte Esterase


 Negative (NEG)


 





 


Urine RBC 0 /HPF (0-2)   


 


Urine WBC


 5-10 /HPF


(0-4) 





 


Urine Squamous Epithelial


Cells Many /LPF  


 





 


Urine Bacteria


 0 /HPF (0-FEW)


 





 


Urine Mucus Marked /LPF   


 


White Blood Count


 


 7.6 x10^3/uL


(4.0-11.0)


 


Red Blood Count


 


 4.74 x10^6/uL


(3.50-5.40)


 


Hemoglobin


 


 13.3 g/dL


(12.0-15.5)


 


Hematocrit


 


 39.9 %


(36.0-47.0)


 


Mean Corpuscular Volume


 


 84 fL ()





 


Mean Corpuscular Hemoglobin  28 pg (25-35)  


 


Mean Corpuscular Hemoglobin


Concent 


 33 g/dL


(31-37)


 


Red Cell Distribution Width


 


 14.3 %


(11.5-14.5)


 


Platelet Count


 


 267 x10^3/uL


(140-400)


 


Neutrophils (%) (Auto)  70 % (31-73)  


 


Lymphocytes (%) (Auto)  23 % (24-48)  L


 


Monocytes (%) (Auto)  6 % (0-9)  


 


Eosinophils (%) (Auto)  1 % (0-3)  


 


Basophils (%) (Auto)  1 % (0-3)  


 


Neutrophils # (Auto)


 


 5.3 x10^3/uL


(1.8-7.7)


 


Lymphocytes # (Auto)


 


 1.7 x10^3/uL


(1.0-4.8)


 


Monocytes # (Auto)


 


 0.4 x10^3/uL


(0.0-1.1)


 


Eosinophils # (Auto)


 


 0.1 x10^3/uL


(0.0-0.7)


 


Basophils # (Auto)


 


 0.1 x10^3/uL


(0.0-0.2)


 


Sodium Level


 


 135 mmol/L


(136-145)  L


 


Potassium Level


 


 4.0 mmol/L


(3.5-5.1)


 


Chloride Level


 


 102 mmol/L


()


 


Carbon Dioxide Level


 


 21 mmol/L


(21-32)


 


Anion Gap  12 (6-14)  


 


Blood Urea Nitrogen


 


 6 mg/dL (7-20)


L


 


Creatinine


 


 0.8 mg/dL


(0.6-1.0)


 


Estimated GFR


(Cockcroft-Gault) 


 107.6  





 


BUN/Creatinine Ratio  8 (6-20)  


 


Glucose Level


 


 77 mg/dL


(70-99)


 


Calcium Level


 


 9.3 mg/dL


(8.5-10.1)


 


Total Bilirubin


 


 0.6 mg/dL


(0.2-1.0)


 


Aspartate Amino Transferase


(AST) 


 21 U/L (15-37)





 


Alanine Aminotransferase (ALT)


 


 19 U/L (14-59)





 


Alkaline Phosphatase


 


 68 U/L


()


 


Total Protein


 


 7.8 g/dL


(6.4-8.2)


 


Albumin


 


 3.8 g/dL


(3.4-5.0)


 


Albumin/Globulin Ratio  1.0 (1.0-1.7)  





                                Laboratory Tests


8/15/19 00:15








                                Laboratory Tests


8/15/19 00:15











Microbiology


19 Wet Prep - Final, Complete


          





                                Laboratory Tests








Test


 19


23:50 8/15/19


00:15


 


Urine Collection Type Unknown   


 


Urine Color Yellow   


 


Urine Clarity Clear   


 


Urine pH 5.5   


 


Urine Specific Gravity >=1.030   


 


Urine Protein


 Negative mg/dL


(NEG-TRACE) 





 


Urine Glucose (UA)


 Negative mg/dL


(NEG) 





 


Urine Ketones (Stick)


 15 mg/dL (NEG)


 





 


Urine Blood


 Negative (NEG)


 





 


Urine Nitrite


 Negative (NEG)


 





 


Urine Bilirubin


 Negative (NEG)


 





 


Urine Urobilinogen Dipstick


 0.2 mg/dL (0.2


mg/dL) 





 


Urine Leukocyte Esterase


 Negative (NEG)


 





 


Urine RBC 0 /HPF (0-2)   


 


Urine WBC


 5-10 /HPF


(0-4) 





 


Urine Squamous Epithelial


Cells Many /LPF  


 





 


Urine Bacteria


 0 /HPF (0-FEW)


 





 


Urine Mucus Marked /LPF   


 


White Blood Count


 


 7.6 x10^3/uL


(4.0-11.0)


 


Red Blood Count


 


 4.74 x10^6/uL


(3.50-5.40)


 


Hemoglobin


 


 13.3 g/dL


(12.0-15.5)


 


Hematocrit


 


 39.9 %


(36.0-47.0)


 


Mean Corpuscular Volume


 


 84 fL ()





 


Mean Corpuscular Hemoglobin  28 pg (25-35)  


 


Mean Corpuscular Hemoglobin


Concent 


 33 g/dL


(31-37)


 


Red Cell Distribution Width


 


 14.3 %


(11.5-14.5)


 


Platelet Count


 


 267 x10^3/uL


(140-400)


 


Neutrophils (%) (Auto)  70 % (31-73)  


 


Lymphocytes (%) (Auto)  23 % (24-48)  L


 


Monocytes (%) (Auto)  6 % (0-9)  


 


Eosinophils (%) (Auto)  1 % (0-3)  


 


Basophils (%) (Auto)  1 % (0-3)  


 


Neutrophils # (Auto)


 


 5.3 x10^3/uL


(1.8-7.7)


 


Lymphocytes # (Auto)


 


 1.7 x10^3/uL


(1.0-4.8)


 


Monocytes # (Auto)


 


 0.4 x10^3/uL


(0.0-1.1)


 


Eosinophils # (Auto)


 


 0.1 x10^3/uL


(0.0-0.7)


 


Basophils # (Auto)


 


 0.1 x10^3/uL


(0.0-0.2)


 


Sodium Level


 


 135 mmol/L


(136-145)  L


 


Potassium Level


 


 4.0 mmol/L


(3.5-5.1)


 


Chloride Level


 


 102 mmol/L


()


 


Carbon Dioxide Level


 


 21 mmol/L


(21-32)


 


Anion Gap  12 (6-14)  


 


Blood Urea Nitrogen


 


 6 mg/dL (7-20)


L


 


Creatinine


 


 0.8 mg/dL


(0.6-1.0)


 


Estimated GFR


(Cockcroft-Gault) 


 107.6  





 


BUN/Creatinine Ratio  8 (6-20)  


 


Glucose Level


 


 77 mg/dL


(70-99)


 


Calcium Level


 


 9.3 mg/dL


(8.5-10.1)


 


Total Bilirubin


 


 0.6 mg/dL


(0.2-1.0)


 


Aspartate Amino Transferase


(AST) 


 21 U/L (15-37)





 


Alanine Aminotransferase (ALT)


 


 19 U/L (14-59)





 


Alkaline Phosphatase


 


 68 U/L


()


 


Total Protein


 


 7.8 g/dL


(6.4-8.2)


 


Albumin


 


 3.8 g/dL


(3.4-5.0)


 


Albumin/Globulin Ratio  1.0 (1.0-1.7)  





                                Laboratory Tests


8/15/19 00:15








                                Laboratory Tests


8/15/19 00:15











Microbiology


19 Wet Prep - Final, Complete





EKG


EKG


[]





Radiology/Procedures


Radiology/Procedures


[]





Course & Med Decision Making


Course & Med Decision Making


Pertinent Labs and Imaging studies reviewed. (See chart for details)





This is a 24-year-old female patient  3 para 2 presenting to the ED today

 with pelvic pain that began a couple minutes prior to coming to the ED. Patient

 was in the ED yesterday for nausea and vomiting in pregnancy. We sent her home 

with cephalexin. She did not buy the medication due to finances as well as 

having to care for her other children. Patient is afebrile.





WBC is normal. Hgb and Hct is normal. CMP with no acute findings. Wet prep 

negative.





UA has improved no leukocytes. 





Preliminary OB ultrasound IUP at 7 weeks. Raptured ovarian cyst on the right.  

Pain is controlled





D/c to home. 





F/u with OBGYN Dr. Castillo in 1-3 days.





Dragon Disclaimer


Dragon Disclaimer


This electronic medical record was generated, in whole or in part, using a voice

 recognition dictation system.





Departure


Departure


Impression:  


   Primary Impression:  


   Abdominal pain in pregnancy


   Additional Impression:  


   Ovarian cyst rupture


Disposition:   HOME, SELF-CARE


Condition:  STABLE


Referrals:  


NO PCP (PCP)








SEAN CASTILLO Jr, MD


follow up as soon as you can


Patient Instructions:  Ovarian Cyst, Easy-to-Read





Additional Instructions:  


You have a raptured ovarian cyst to the right side. Please apply a heating pad 

to the area. Take Tylenol as needed for pain.  Follow up with your OBGYN as soon

 as you can. Please fill prescription you got yesterday for UTI and take the med

icine to completion.





Problem Qualifiers








   Primary Impression:  


   Abdominal pain in pregnancy


   Trimester:  first trimester  Qualified Codes:  O26.891 - Other specified 

   pregnancy related conditions, first trimester; R10.9 - Unspecified abdominal 

   pain








ELLAMIMIKERRY APRN              Aug 15, 2019 00:04

## 2019-08-15 NOTE — RAD
OB ultrasound dated 8/15/2019.

 

No comparison available.

 

Clinical data indication: Abdominal pain and pregnancy.

 

FINDINGS:

 

Transabdominal and transvaginal imaging was performed. Gestational sac and

fetal pole within the endometrial canal. Crown-rump length measures 0.98 

cm, correlating with a 7 week 0 day gestation with estimated sonographic 

date of delivery of 4/2/2020. Yolk sac is visualized. There is fetal 

cardiac activity of 160 bpm. No subchorionic collection. An air fluid 

volume appears appropriate. The placenta is not well evaluated.

 

Right ovary measures 5.1 x 3.5 x 2.7 cm. Left ovary measures 2.7 x 2.4 x 

1.9 cm. There is normal color Doppler flow to both ovaries. There is a 2.9

cm right ovarian cyst. Additional collapsing cyst at the right ovary 

measures up to 4.5 cm in size. Small amount of free pelvic fluid.

 

IMPRESSION:

1. Single viable intrauterine gestation with estimated sonographic 

gestational age of 7 weeks 0 days. No acute findings.

2. Simple and complex cysts at the right ovary, measuring up to 4.5 cm in 

size.

3. Small amount of free pelvic fluid, nonspecific.

 

Electronically signed by: Herberth Montano MD (8/15/2019 3:56 AM) 

[id number]

## 2019-08-21 ENCOUNTER — HOSPITAL ENCOUNTER (INPATIENT)
Dept: HOSPITAL 61 - 3 NORTH | Age: 24
LOS: 2 days | Discharge: HOME | DRG: 833 | End: 2019-08-23
Attending: OBSTETRICS & GYNECOLOGY | Admitting: OBSTETRICS & GYNECOLOGY
Payer: SELF-PAY

## 2019-08-21 VITALS — SYSTOLIC BLOOD PRESSURE: 130 MMHG | DIASTOLIC BLOOD PRESSURE: 77 MMHG

## 2019-08-21 VITALS — DIASTOLIC BLOOD PRESSURE: 74 MMHG | SYSTOLIC BLOOD PRESSURE: 114 MMHG

## 2019-08-21 VITALS — HEIGHT: 62 IN | BODY MASS INDEX: 40.48 KG/M2 | WEIGHT: 220 LBS

## 2019-08-21 DIAGNOSIS — O23.41: Primary | ICD-10-CM

## 2019-08-21 DIAGNOSIS — Z3A.08: ICD-10-CM

## 2019-08-21 LAB
ALBUMIN SERPL-MCNC: 3.2 G/DL (ref 3.4–5)
ALBUMIN/GLOB SERPL: 0.7 {RATIO} (ref 1–1.7)
ALP SERPL-CCNC: 67 U/L (ref 46–116)
ALT SERPL-CCNC: 15 U/L (ref 14–59)
ANION GAP SERPL CALC-SCNC: 9 MMOL/L (ref 6–14)
AST SERPL-CCNC: 13 U/L (ref 15–37)
BASOPHILS # BLD AUTO: 0 X10^3/UL (ref 0–0.2)
BASOPHILS NFR BLD: 1 % (ref 0–3)
BILIRUB SERPL-MCNC: 0.3 MG/DL (ref 0.2–1)
BUN SERPL-MCNC: 6 MG/DL (ref 7–20)
BUN/CREAT SERPL: 9 (ref 6–20)
CALCIUM SERPL-MCNC: 9 MG/DL (ref 8.5–10.1)
CHLORIDE SERPL-SCNC: 100 MMOL/L (ref 98–107)
CO2 SERPL-SCNC: 24 MMOL/L (ref 21–32)
CREAT SERPL-MCNC: 0.7 MG/DL (ref 0.6–1)
EOSINOPHIL NFR BLD: 0.1 X10^3/UL (ref 0–0.7)
EOSINOPHIL NFR BLD: 2 % (ref 0–3)
ERYTHROCYTE [DISTWIDTH] IN BLOOD BY AUTOMATED COUNT: 13.9 % (ref 11.5–14.5)
GFR SERPLBLD BASED ON 1.73 SQ M-ARVRAT: 125.5 ML/MIN
GLOBULIN SER-MCNC: 4.3 G/DL (ref 2.2–3.8)
GLUCOSE SERPL-MCNC: 104 MG/DL (ref 70–99)
HCT VFR BLD CALC: 39.3 % (ref 36–47)
HGB BLD-MCNC: 13.5 G/DL (ref 12–15.5)
LYMPHOCYTES # BLD: 1.7 X10^3/UL (ref 1–4.8)
LYMPHOCYTES NFR BLD AUTO: 29 % (ref 24–48)
MCH RBC QN AUTO: 29 PG (ref 25–35)
MCHC RBC AUTO-ENTMCNC: 34 G/DL (ref 31–37)
MCV RBC AUTO: 84 FL (ref 79–100)
MONO #: 0.3 X10^3/UL (ref 0–1.1)
MONOCYTES NFR BLD: 5 % (ref 0–9)
NEUT #: 3.6 X10^3/UL (ref 1.8–7.7)
NEUTROPHILS NFR BLD AUTO: 63 % (ref 31–73)
PLATELET # BLD AUTO: 275 X10^3/UL (ref 140–400)
POTASSIUM SERPL-SCNC: 3.4 MMOL/L (ref 3.5–5.1)
PROT SERPL-MCNC: 7.5 G/DL (ref 6.4–8.2)
RBC # BLD AUTO: 4.67 X10^6/UL (ref 3.5–5.4)
SODIUM SERPL-SCNC: 133 MMOL/L (ref 136–145)
WBC # BLD AUTO: 5.7 X10^3/UL (ref 4–11)

## 2019-08-21 PROCEDURE — 76801 OB US < 14 WKS SINGLE FETUS: CPT

## 2019-08-21 PROCEDURE — 36415 COLL VENOUS BLD VENIPUNCTURE: CPT

## 2019-08-21 PROCEDURE — 86901 BLOOD TYPING SEROLOGIC RH(D): CPT

## 2019-08-21 PROCEDURE — G0378 HOSPITAL OBSERVATION PER HR: HCPCS

## 2019-08-21 PROCEDURE — 85025 COMPLETE CBC W/AUTO DIFF WBC: CPT

## 2019-08-21 PROCEDURE — 86900 BLOOD TYPING SEROLOGIC ABO: CPT

## 2019-08-21 PROCEDURE — 80053 COMPREHEN METABOLIC PANEL: CPT

## 2019-08-21 PROCEDURE — 86703 HIV-1/HIV-2 1 RESULT ANTBDY: CPT

## 2019-08-21 PROCEDURE — 86592 SYPHILIS TEST NON-TREP QUAL: CPT

## 2019-08-21 PROCEDURE — 86850 RBC ANTIBODY SCREEN: CPT

## 2019-08-21 RX ADMIN — SODIUM CHLORIDE, SODIUM LACTATE, POTASSIUM CHLORIDE, AND CALCIUM CHLORIDE SCH MLS/HR: .6; .31; .03; .02 INJECTION, SOLUTION INTRAVENOUS at 23:59

## 2019-08-21 RX ADMIN — CEFTRIAXONE SCH GM: 1 INJECTION, POWDER, FOR SOLUTION INTRAMUSCULAR; INTRAVENOUS at 17:38

## 2019-08-21 RX ADMIN — SODIUM CHLORIDE, SODIUM LACTATE, POTASSIUM CHLORIDE, AND CALCIUM CHLORIDE SCH MLS/HR: .6; .31; .03; .02 INJECTION, SOLUTION INTRAVENOUS at 17:33

## 2019-08-21 RX ADMIN — ACETAMINOPHEN PRN MG: 325 TABLET, FILM COATED ORAL at 20:08

## 2019-08-21 NOTE — RAD
Exam: Ultrasound OB less than 14 weeks

 

Indication: Abdominal pain

 

Technique: Real-time grayscale and color Doppler images of the pelvis were

obtained by the department sonographer. Transabdominal images only.

 

Comparisons: 8/14/2019

 

FINDINGS:

Uterus measures 11.9 x 8.6 x 6.9 cm. Within the endometrium there is a 

gestational sac with fetal pole. Fetal heart rate measured at 169 bpm. 

Fetal pole measured at 2.1 cm corresponding to 8 weeks 5 days.

 

Right ovary measures 5.0 x 4.1 x 3.6 cm.

 

Left ovary measures 2.4 x 1.6 x 1.6 cm. 

 

Vascular flow identified within the ovaries bilaterally.

 

IMPRESSION:

Single live intrauterine pregnancy with estimated sonographic due date 

from prior exam 4/2/2020. Appropriate growth. Dedicated fetal survey is 

recommended at 18-20 weeks gestation.

 

Electronically signed by: Carl Gomez MD (8/21/2019 7:37 PM) [facility]

## 2019-08-22 VITALS — SYSTOLIC BLOOD PRESSURE: 111 MMHG | DIASTOLIC BLOOD PRESSURE: 56 MMHG

## 2019-08-22 VITALS — DIASTOLIC BLOOD PRESSURE: 72 MMHG | SYSTOLIC BLOOD PRESSURE: 131 MMHG

## 2019-08-22 VITALS — DIASTOLIC BLOOD PRESSURE: 68 MMHG | SYSTOLIC BLOOD PRESSURE: 118 MMHG

## 2019-08-22 VITALS — SYSTOLIC BLOOD PRESSURE: 115 MMHG | DIASTOLIC BLOOD PRESSURE: 74 MMHG

## 2019-08-22 VITALS — SYSTOLIC BLOOD PRESSURE: 142 MMHG | DIASTOLIC BLOOD PRESSURE: 89 MMHG

## 2019-08-22 RX ADMIN — SODIUM CHLORIDE, SODIUM LACTATE, POTASSIUM CHLORIDE, AND CALCIUM CHLORIDE SCH MLS/HR: .6; .31; .03; .02 INJECTION, SOLUTION INTRAVENOUS at 06:13

## 2019-08-22 RX ADMIN — SODIUM CHLORIDE, SODIUM LACTATE, POTASSIUM CHLORIDE, AND CALCIUM CHLORIDE SCH MLS/HR: .6; .31; .03; .02 INJECTION, SOLUTION INTRAVENOUS at 19:55

## 2019-08-22 RX ADMIN — CEFTRIAXONE SCH GM: 1 INJECTION, POWDER, FOR SOLUTION INTRAMUSCULAR; INTRAVENOUS at 18:08

## 2019-08-22 NOTE — PDOC1
OB - History


Hx of Present Pregnancy


Prenatal Care:  None


Ultrasounds:  No ultrasounds


Obstetrical Complications:  Other (complicated UTI)


Medical Complications:  None





Past Family/Social History


*


Past Medical, Surgical, Family and Obstetric Histories reviewed from prenatal 

chart.


Blood Type:  Unknown


Rubella:  Unknown


RPR/VDRL:  Unknown


GBS Status:  Unknown


HBsAG:  Unknown





OB - Chief Complaint & HPI


Date of Admission:


Date of Admission:  Aug 21, 2019 at 15:47


Chief Complaint/History


:  4


Para:  2


EGA:  8


Reason for admission:  observation (complicated UTI with fever)


Admission Nurse Assessment Rev:  Yes





OB - Admission Exam


Physical Exam


Vitals:





                          VS - Last 72 Hours, by Label








  Date Time  Temp Pulse Resp B/P (MAP) Pulse Ox O2 Delivery O2 Flow Rate FiO2


 


19 06:15 98.6 65 18 111/56 (74) 100 Room Air  





 98.6       


 


19 02:15 98.4 80 18 115/74 (88) 98 Room Air  





 98.4       


 


19 19:30 98.1 68 18 114/74 (87) 100 Room Air  





 98.1       


 


19 16:00 98.3 73 20 130/77 (94) 99 Room Air  





 98.3       








HEENT:  Other (dry mucous membranes)


Heart:  Regular Rate


Lungs:  Clear


Abdomen:  Gravid, Soft, Tender


Reflexes:  Normal


Cervical Dilatation:  None


Effacement:  0%


Station:  Ballotable


Membranes:  Intact


Long Term Variability:  Moderate


Contractions on Admission:  None


Text


A: 8 wks IUP


    Fever


    Complicated UTI


P: Observation for IV fluids and IV abx.











SEAN TEMPLE Jr, MD          Aug 22, 2019 09:47

## 2019-08-23 VITALS — SYSTOLIC BLOOD PRESSURE: 132 MMHG | DIASTOLIC BLOOD PRESSURE: 71 MMHG

## 2019-08-23 VITALS — SYSTOLIC BLOOD PRESSURE: 126 MMHG | DIASTOLIC BLOOD PRESSURE: 72 MMHG

## 2019-08-23 VITALS — SYSTOLIC BLOOD PRESSURE: 121 MMHG | DIASTOLIC BLOOD PRESSURE: 78 MMHG

## 2019-08-23 RX ADMIN — ACETAMINOPHEN PRN MG: 325 TABLET, FILM COATED ORAL at 01:47

## 2019-08-23 NOTE — NUR
Patient has scant amount of bloody vaginal discharge, pale pink in appearance.  Dr. Castillo 
notified and no orders received at this time.  Will continue to monitor.  Instructed patient 
to let nurse know if bleeding increases or if she starts having pain, patient verbalizes 
understanding.

## 2019-08-23 NOTE — DISCH
DISCHARGE INSTRUCTIONS


Condition on Discharge


Condition on Discharge:  Stable





Activity After Discharge


Activity Instructions for Disc:  Activity as tolerated


Lifting Instructions after Dis:  No heavy lifting


Driving Instructions after Dis:  Do not drive today





Diet after Discharge


Diet after Discharge:  Regular





Contacting the [facility] after DC


Call your doctor for:  Concerns you may have





Follow-Up


Follow up with:  Dr. Castillo in 2 wks.











SEAN CASTILLO Jr, MD          Aug 23, 2019 08:32

## 2019-08-23 NOTE — PDOC3
OB DISCHARGE SUMMARY


DATE OF ADMISSION:  


8/21/19


DATE OF DISCHARGE:  


8/23/19


REASON FOR ADMISSION:  Observation/evaluation


INTRAPARTUM PROCEDURES:  Others (IV hydration and IV abx for complicated UTI)


DISCHARGE INFORMATION:  Activity (ad agatha), Diet (regular), Instructions (pelvic 

rest until 2nd trimester)


HOSPITAL COURSE


First trimester gestation with complicated UTI was provided IV hydration and IV 

abx.











SEAN TEMPLE Jr, MD          Aug 23, 2019 08:28

## 2019-09-05 ENCOUNTER — HOSPITAL ENCOUNTER (EMERGENCY)
Dept: HOSPITAL 61 - ER | Age: 24
Discharge: HOME | End: 2019-09-05
Payer: SELF-PAY

## 2019-09-05 VITALS
DIASTOLIC BLOOD PRESSURE: 64 MMHG | SYSTOLIC BLOOD PRESSURE: 131 MMHG | SYSTOLIC BLOOD PRESSURE: 131 MMHG | DIASTOLIC BLOOD PRESSURE: 64 MMHG

## 2019-09-05 VITALS — HEIGHT: 62 IN | WEIGHT: 222 LBS | BODY MASS INDEX: 40.85 KG/M2

## 2019-09-05 DIAGNOSIS — Z3A.10: ICD-10-CM

## 2019-09-05 DIAGNOSIS — N28.1: ICD-10-CM

## 2019-09-05 DIAGNOSIS — O16.1: ICD-10-CM

## 2019-09-05 DIAGNOSIS — O21.9: ICD-10-CM

## 2019-09-05 DIAGNOSIS — O26.831: Primary | ICD-10-CM

## 2019-09-05 LAB
ALBUMIN SERPL-MCNC: 3.6 G/DL (ref 3.4–5)
ALBUMIN/GLOB SERPL: 0.9 {RATIO} (ref 1–1.7)
ALP SERPL-CCNC: 68 U/L (ref 46–116)
ALT SERPL-CCNC: 13 U/L (ref 14–59)
AMPHETAMINE/METHAMPHETAMINE: (no result)
ANION GAP SERPL CALC-SCNC: 11 MMOL/L (ref 6–14)
APTT PPP: (no result) S
AST SERPL-CCNC: 22 U/L (ref 15–37)
BACTERIA #/AREA URNS HPF: (no result) /HPF
BARBITURATES UR-MCNC: (no result) UG/ML
BASOPHILS # BLD AUTO: 0 X10^3/UL (ref 0–0.2)
BASOPHILS NFR BLD: 0 % (ref 0–3)
BENZODIAZ UR-MCNC: (no result) UG/L
BILIRUB SERPL-MCNC: 1 MG/DL (ref 0.2–1)
BILIRUB UR QL STRIP: NEGATIVE
BUN SERPL-MCNC: 6 MG/DL (ref 7–20)
BUN/CREAT SERPL: 12 (ref 6–20)
CALCIUM SERPL-MCNC: 9.4 MG/DL (ref 8.5–10.1)
CANNABINOIDS UR-MCNC: (no result) UG/L
CHLORIDE SERPL-SCNC: 101 MMOL/L (ref 98–107)
CO2 SERPL-SCNC: 24 MMOL/L (ref 21–32)
COCAINE UR-MCNC: (no result) NG/ML
CREAT SERPL-MCNC: 0.5 MG/DL (ref 0.6–1)
EOSINOPHIL NFR BLD: 0.1 X10^3/UL (ref 0–0.7)
EOSINOPHIL NFR BLD: 1 % (ref 0–3)
ERYTHROCYTE [DISTWIDTH] IN BLOOD BY AUTOMATED COUNT: 14.3 % (ref 11.5–14.5)
FIBRINOGEN PPP-MCNC: CLEAR MG/DL
GFR SERPLBLD BASED ON 1.73 SQ M-ARVRAT: 185 ML/MIN
GLOBULIN SER-MCNC: 4 G/DL (ref 2.2–3.8)
GLUCOSE SERPL-MCNC: 81 MG/DL (ref 70–99)
HCT VFR BLD CALC: 39.2 % (ref 36–47)
HGB BLD-MCNC: 13.5 G/DL (ref 12–15.5)
LIPASE: 86 U/L (ref 73–393)
LYMPHOCYTES # BLD: 1.8 X10^3/UL (ref 1–4.8)
LYMPHOCYTES NFR BLD AUTO: 31 % (ref 24–48)
MCH RBC QN AUTO: 29 PG (ref 25–35)
MCHC RBC AUTO-ENTMCNC: 34 G/DL (ref 31–37)
MCV RBC AUTO: 84 FL (ref 79–100)
METHADONE SERPL-MCNC: (no result) NG/ML
MONO #: 0.4 X10^3/UL (ref 0–1.1)
MONOCYTES NFR BLD: 7 % (ref 0–9)
NEUT #: 3.6 X10^3/UL (ref 1.8–7.7)
NEUTROPHILS NFR BLD AUTO: 61 % (ref 31–73)
NITRITE UR QL STRIP: NEGATIVE
OPIATES UR-MCNC: (no result) NG/ML
PCP SERPL-MCNC: (no result) MG/DL
PH UR STRIP: 6 [PH]
PLATELET # BLD AUTO: 266 X10^3/UL (ref 140–400)
POTASSIUM SERPL-SCNC: 4.1 MMOL/L (ref 3.5–5.1)
PROT SERPL-MCNC: 7.6 G/DL (ref 6.4–8.2)
PROT UR STRIP-MCNC: NEGATIVE MG/DL
RBC # BLD AUTO: 4.66 X10^6/UL (ref 3.5–5.4)
RBC #/AREA URNS HPF: 0 /HPF (ref 0–2)
SODIUM SERPL-SCNC: 136 MMOL/L (ref 136–145)
SQUAMOUS #/AREA URNS LPF: (no result) /LPF
UROBILINOGEN UR-MCNC: 1 MG/DL
WBC # BLD AUTO: 5.9 X10^3/UL (ref 4–11)
WBC #/AREA URNS HPF: (no result) /HPF (ref 0–4)

## 2019-09-05 PROCEDURE — 80307 DRUG TEST PRSMV CHEM ANLYZR: CPT

## 2019-09-05 PROCEDURE — 87591 N.GONORRHOEAE DNA AMP PROB: CPT

## 2019-09-05 PROCEDURE — 83690 ASSAY OF LIPASE: CPT

## 2019-09-05 PROCEDURE — 87086 URINE CULTURE/COLONY COUNT: CPT

## 2019-09-05 PROCEDURE — 81001 URINALYSIS AUTO W/SCOPE: CPT

## 2019-09-05 PROCEDURE — 80053 COMPREHEN METABOLIC PANEL: CPT

## 2019-09-05 PROCEDURE — 87491 CHLMYD TRACH DNA AMP PROBE: CPT

## 2019-09-05 PROCEDURE — 76801 OB US < 14 WKS SINGLE FETUS: CPT

## 2019-09-05 PROCEDURE — 96374 THER/PROPH/DIAG INJ IV PUSH: CPT

## 2019-09-05 PROCEDURE — 84702 CHORIONIC GONADOTROPIN TEST: CPT

## 2019-09-05 PROCEDURE — 36415 COLL VENOUS BLD VENIPUNCTURE: CPT

## 2019-09-05 PROCEDURE — 96361 HYDRATE IV INFUSION ADD-ON: CPT

## 2019-09-05 PROCEDURE — 85025 COMPLETE CBC W/AUTO DIFF WBC: CPT

## 2019-09-05 PROCEDURE — 99285 EMERGENCY DEPT VISIT HI MDM: CPT

## 2019-09-05 NOTE — RAD
STUDY: Ultrasound pregnancy first trimester

 

INDICATION: Abdominal pain in the setting of a known pregnancy.

 

COMPARISON: OB sonogram 8/21/2019

 

TECHNIQUE: Real-time grayscale and color Doppler sonographic evaluation of

the pelvis utilizing transabdominal technique.

 

FINDINGS:

 

Intrauterine gestational sac containing a single fetal pole with a 

measured crown-rump length of 3.54 cm. This corresponds to an estimated 

gestational age of 10 weeks 3 days. Fetal heart rate of 173 bpm.

 

The uterus measures 12.1 x 9.0 x 7.8 cm. The cervix measures 5.5 cm in 

length and is closed. Heterogeneous rounded focus within the uterine 

fundus measuring 3.4 x 3.4 x 2.8 cm and with only minimal peripheral 

vascularity is suggestive of a uterine fibroid.

 

The right ovary measures 4.4 x 3.3 x 3.6 cm and contains a simple cyst 

measuring 2.8 x 2.9 x 2.4 cm. The left ovary measures 3.0 x 2.7 x 2.4 cm. 

Normal Doppler flow to both ovaries.

 

No free fluid within the pelvis.

 

IMPRESSION:

1. Single live intrauterine pregnancy with an estimated gestational age of

10 weeks 3 days. No complicating features identified.

2. Normal Doppler flow to both ovaries. Simple right renal cyst measuring 

2.8 x 2.9 x 2.4 cm.

3. Probable fibroid at the uterine fundus measuring up to 3.4 cm.

 

Electronically signed by: ÁNGELA CARLIN MD (9/5/2019 6:14 PM) [facility]

## 2019-10-09 ENCOUNTER — HOSPITAL ENCOUNTER (OUTPATIENT)
Dept: HOSPITAL 61 - LAB | Age: 24
Discharge: HOME | End: 2019-10-09
Attending: OBSTETRICS & GYNECOLOGY
Payer: MEDICAID

## 2019-10-09 DIAGNOSIS — Z32.01: Primary | ICD-10-CM

## 2019-10-09 DIAGNOSIS — Z98.891: ICD-10-CM

## 2019-10-09 LAB
BASOPHILS # BLD AUTO: 0 X10^3/UL (ref 0–0.2)
BASOPHILS NFR BLD: 1 % (ref 0–3)
EOSINOPHIL NFR BLD: 0.2 X10^3/UL (ref 0–0.7)
EOSINOPHIL NFR BLD: 3 % (ref 0–3)
ERYTHROCYTE [DISTWIDTH] IN BLOOD BY AUTOMATED COUNT: 14.6 % (ref 11.5–14.5)
HCT VFR BLD CALC: 41.1 % (ref 36–47)
HGB BLD-MCNC: 13.9 G/DL (ref 12–15.5)
LYMPHOCYTES # BLD: 1.8 X10^3/UL (ref 1–4.8)
LYMPHOCYTES NFR BLD AUTO: 34 % (ref 24–48)
MCH RBC QN AUTO: 29 PG (ref 25–35)
MCHC RBC AUTO-ENTMCNC: 34 G/DL (ref 31–37)
MCV RBC AUTO: 84 FL (ref 79–100)
MONO #: 0.4 X10^3/UL (ref 0–1.1)
MONOCYTES NFR BLD: 7 % (ref 0–9)
NEUT #: 2.9 X10^3/UL (ref 1.8–7.7)
NEUTROPHILS NFR BLD AUTO: 55 % (ref 31–73)
PLATELET # BLD AUTO: 248 X10^3/UL (ref 140–400)
RBC # BLD AUTO: 4.88 X10^6/UL (ref 3.5–5.4)
WBC # BLD AUTO: 5.3 X10^3/UL (ref 4–11)

## 2019-10-09 PROCEDURE — 36415 COLL VENOUS BLD VENIPUNCTURE: CPT

## 2019-10-09 PROCEDURE — 86592 SYPHILIS TEST NON-TREP QUAL: CPT

## 2019-10-09 PROCEDURE — 86900 BLOOD TYPING SEROLOGIC ABO: CPT

## 2019-10-09 PROCEDURE — 86762 RUBELLA ANTIBODY: CPT

## 2019-10-09 PROCEDURE — 86901 BLOOD TYPING SEROLOGIC RH(D): CPT

## 2019-10-09 PROCEDURE — 85025 COMPLETE CBC W/AUTO DIFF WBC: CPT

## 2019-10-09 PROCEDURE — 86703 HIV-1/HIV-2 1 RESULT ANTBDY: CPT

## 2019-10-09 PROCEDURE — 86850 RBC ANTIBODY SCREEN: CPT

## 2019-10-10 LAB — RUBELLA IGG ANTIBODY: 1.26 INDEX

## 2023-01-17 NOTE — PHYS DOC
Past Medical History


Past Medical History:  Hypertension, Other


Additional Past Medical Histor:  Bell's Palsy, HTN DURING PREGNANCY


 (KERRY KABA)


Past Surgical History:  Other


Additional Past Surgical Histo:  D&C


 (KERRY KABA)


Alcohol Use:  None


Drug Use:  None


 (KERRY KABA)





Adult General


Chief Complaint


Chief Complaint:  VOMITING IN PREGNANCY





[facility]


HPI





Patient is a 24  year old female with history of hypertension  3 para 2 

currently 10 weeks pregnant presenting to the ED today complaining of 9 out of 

10 generalized abdominal pain with nausea and vomiting that began today. Patient

denies any concerns for STDs. Denies any vaginal bleeding. Denies any 

exacerbating or relieving factors to her symptoms. She states she has not seen 

the OB/GYN. Because she doesn't have insurance.





Off note this is patient's fifth visit since  for the similar complaints





OB Dr. Castillo


 (KERRY KABA)





Review of Systems


Review of Systems





Constitutional: Denies fever or chills []


Eyes: Denies change in visual acuity, redness, or eye pain []


HENT: Denies nasal congestion or sore throat []


Respiratory: Denies cough or shortness of breath []


Cardiovascular: No additional information not addressed in HPI []


GI: Reports abdominal pain in pregnancy with nausea and vomiting, denies bloody 

stools or diarrhea []


: Denies dysuria or hematuria []


Musculoskeletal: Denies back pain or joint pain []


Integument: Denies rash or skin lesions []


Neurologic: Denies headache, focal weakness or sensory changes []








All other systems were reviewed and found to be within normal limits, except as 

documented in this note.


 (KERRY KABA)





Current Medications


Current Medications





Current Medications








 Medications


  (Trade)  Dose


 Ordered  Sig/Devan  Start Time


 Stop Time Status Last Admin


Dose Admin


 


 Acetaminophen


  (Tylenol)  1,000 mg  1X  ONCE  19 17:45


 19 17:46 DC 19 17:34


1,000 MG


 


 Ondansetron HCl


  (Zofran)  4 mg  1X  ONCE  19 17:45


 19 17:46 DC 19 17:34


4 MG


 


 Sodium Chloride  1,000 ml @ 


 1,000 mls/hr  1X  ONCE  19 17:45


 19 18:44 DC 19 17:34


1,000 MLS/HR





 (AMBROSIO SCHROEDER DO)





Allergies


Allergies





Allergies








Coded Allergies Type Severity Reaction Last Updated Verified


 


  No Known Drug Allergies    17 No





 (AMBROSIO SCHROEDER DO)





Physical Exam


Physical Exam





Constitutional: Well developed, well nourished, no acute distress, non-toxic 

appearance. []


HENT: Normocephalic, atraumatic, bilateral external ears normal, oropharynx 

moist, no oral exudates, nose normal. []


Eyes: PERRLA, EOMI, conjunctiva normal, no discharge. [] 


Neck: Normal range of motion, no tenderness, supple, no stridor. [] 


Cardiovascular:Heart rate regular rhythm, no murmur []


Lungs & Thorax:  Bilateral breath sounds clear to auscultation []


Abdomen: Bowel sounds normal, soft, no tenderness, no masses, no pulsatile 

masses. [] 


Skin: Warm, dry, no erythema, no rash. [] 


Back: No tenderness, no CVA tenderness. [] 


Extremities: No tenderness, no cyanosis, no clubbing, ROM intact, no edema. [] 


Neurologic: Alert and oriented X 3, normal motor function, normal sensory 

function, no focal deficits noted. []


Psychologic: Affect normal, judgement normal, mood normal. []


 (KERRY KABA)





Current Patient Data


Vital Signs





                                   Vital Signs








  Date Time  Temp Pulse Resp B/P (MAP) Pulse Ox O2 Delivery O2 Flow Rate FiO2


 


19 19:48  65 16 131/64 (86) 100 Room Air  


 


19 16:40 97.4       





 97.4       





 (AMBROSIO SCHROEDER DO)


Lab Values





                                Laboratory Tests








Test


 19


16:57 19


17:30


 


Urine Collection Type Unknown   


 


Urine Color Carito   


 


Urine Clarity Clear   


 


Urine pH 6.0   


 


Urine Specific Gravity >=1.030   


 


Urine Protein


 Negative mg/dL


(NEG-TRACE) 





 


Urine Glucose (UA)


 Negative mg/dL


(NEG) 





 


Urine Ketones (Stick)


 40 mg/dL (NEG)


 





 


Urine Blood


 Negative (NEG)


 





 


Urine Nitrite


 Negative (NEG)


 





 


Urine Bilirubin


 Negative (NEG)


 





 


Urine Urobilinogen Dipstick


 1.0 mg/dL (0.2


mg/dL) 





 


Urine Leukocyte Esterase Trace (NEG)   


 


Urine RBC 0 /HPF (0-2)   


 


Urine WBC


 1-4 /HPF (0-4)


 





 


Urine Squamous Epithelial


Cells Many /LPF  


 





 


Urine Bacteria


 Moderate /HPF


(0-FEW) 





 


Urine Mucus Marked /LPF   


 


Urine Opiates Screen Neg (NEG)   


 


Urine Methadone Screen Neg (NEG)   


 


Urine Barbiturates Neg (NEG)   


 


Urine Phencyclidine Screen Neg (NEG)   


 


Urine


Amphetamine/Methamphetamine Neg (NEG)  


 





 


Urine Benzodiazepines Screen Neg (NEG)   


 


Urine Cocaine Screen Neg (NEG)   


 


Urine Cannabinoids Screen Pos (NEG)   


 


Urine Ethyl Alcohol Neg (NEG)   


 


White Blood Count


 


 5.9 x10^3/uL


(4.0-11.0)


 


Red Blood Count


 


 4.66 x10^6/uL


(3.50-5.40)


 


Hemoglobin


 


 13.5 g/dL


(12.0-15.5)


 


Hematocrit


 


 39.2 %


(36.0-47.0)


 


Mean Corpuscular Volume


 


 84 fL ()





 


Mean Corpuscular Hemoglobin  29 pg (25-35)  


 


Mean Corpuscular Hemoglobin


Concent 


 34 g/dL


(31-37)


 


Red Cell Distribution Width


 


 14.3 %


(11.5-14.5)


 


Platelet Count


 


 266 x10^3/uL


(140-400)


 


Neutrophils (%) (Auto)  61 % (31-73)  


 


Lymphocytes (%) (Auto)  31 % (24-48)  


 


Monocytes (%) (Auto)  7 % (0-9)  


 


Eosinophils (%) (Auto)  1 % (0-3)  


 


Basophils (%) (Auto)  0 % (0-3)  


 


Neutrophils # (Auto)


 


 3.6 x10^3/uL


(1.8-7.7)


 


Lymphocytes # (Auto)


 


 1.8 x10^3/uL


(1.0-4.8)


 


Monocytes # (Auto)


 


 0.4 x10^3/uL


(0.0-1.1)


 


Eosinophils # (Auto)


 


 0.1 x10^3/uL


(0.0-0.7)


 


Basophils # (Auto)


 


 0.0 x10^3/uL


(0.0-0.2)


 


Maternal Serum HCG Beta


Subunit 


 49286 mIU/mL


(0-5)  H


 


Sodium Level


 


 136 mmol/L


(136-145)


 


Potassium Level


 


 4.1 mmol/L


(3.5-5.1)


 


Chloride Level


 


 101 mmol/L


()


 


Carbon Dioxide Level


 


 24 mmol/L


(21-32)


 


Anion Gap  11 (6-14)  


 


Blood Urea Nitrogen


 


 6 mg/dL (7-20)


L


 


Creatinine


 


 0.5 mg/dL


(0.6-1.0)  L


 


Estimated GFR


(Cockcroft-Gault) 


 185.0  





 


BUN/Creatinine Ratio  12 (6-20)  


 


Glucose Level


 


 81 mg/dL


(70-99)


 


Calcium Level


 


 9.4 mg/dL


(8.5-10.1)


 


Total Bilirubin


 


 1.0 mg/dL


(0.2-1.0)


 


Aspartate Amino Transferase


(AST) 


 22 U/L (15-37)





 


Alanine Aminotransferase (ALT)


 


 13 U/L (14-59)


L


 


Alkaline Phosphatase


 


 68 U/L


()


 


Total Protein


 


 7.6 g/dL


(6.4-8.2)


 


Albumin


 


 3.6 g/dL


(3.4-5.0)


 


Albumin/Globulin Ratio


 


 0.9 (1.0-1.7)


L


 


Lipase


 


 86 U/L


()


 


Ethyl Alcohol Level


 


 < 10 mg/dL


(0-10)





                                Laboratory Tests


19 17:30








                                Laboratory Tests


19 17:30











Microbiology


19 Wet Prep - Final, Complete


         


 (AMBROSIO SCHROEDER DO)





EKG


EKG


[]


 (KERRY KABA)





Radiology/Procedures


Radiology/Procedures


[]PROCEDURE: PREG 1ST TRIMESTER





STUDY: Ultrasound pregnancy first trimester


 


INDICATION: Abdominal pain in the setting of a known pregnancy.


 


COMPARISON: OB sonogram 2019


 


TECHNIQUE: Real-time grayscale and color Doppler sonographic evaluation of


the pelvis utilizing transabdominal technique.


 


FINDINGS:


 


Intrauterine gestational sac containing a single fetal pole with a 


measured crown-rump length of 3.54 cm. This corresponds to an estimated 


gestational age of 10 weeks 3 days. Fetal heart rate of 173 bpm.


 


The uterus measures 12.1 x 9.0 x 7.8 cm. The cervix measures 5.5 cm in 


length and is closed. Heterogeneous rounded focus within the uterine 


fundus measuring 3.4 x 3.4 x 2.8 cm and with only minimal peripheral 


vascularity is suggestive of a uterine fibroid.


 


The right ovary measures 4.4 x 3.3 x 3.6 cm and contains a simple cyst 


measuring 2.8 x 2.9 x 2.4 cm. The left ovary measures 3.0 x 2.7 x 2.4 cm. 


Normal Doppler flow to both ovaries.


 


No free fluid within the pelvis.


 


IMPRESSION:


1. Single live intrauterine pregnancy with an estimated gestational age of


10 weeks 3 days. No complicating features identified.


2. Normal Doppler flow to both ovaries. Simple right renal cyst measuring 


2.8 x 2.9 x 2.4 cm.


3. Probable fibroid at the uterine fundus measuring up to 3.4 cm.


 


Electronically signed by: ÁNGELA CARLIN MD (2019 6:14 PM) [facility]














DICTATED and SIGNED BY:     ÁNGELA CARLIN MD


DATE:     19


 (KERRY KABA)





Course & Med Decision Making


Course & Med Decision Making


Pertinent Labs and Imaging studies reviewed. (See chart for details)





This is a 24-year-old female patient  3 para 2 currently 10 weeks 

pregnant presenting to the ED today complaining of abdominal pain in pregnancy 

with nausea and vomiting since this morning. This is patient's fifth visit since

  for similar complaints. She has not seen her OB/GYN because she does 

not have insurance.





Patient has another reassuring workup today including OB ultrasound.  She was 

informed to follow-up with her own PCP or urologist for the simple renal cyst 

showing up on ultrasound. Was IV fluids and Zofran and discharged to home. E

ncouraged follow-up with her OB/GYN.








 (KERRY KABA)





Dragon Disclaimer


Dragon Disclaimer


This electronic medical record was generated, in whole or in part, using a voice

 recognition dictation system.


 (KERRY KABA)





Departure


Departure


Impression:  


   Primary Impression:  


   Abdominal pain in pregnancy


   Additional Impressions:  


   Nausea and vomiting during pregnancy


   Renal cyst


Disposition:   HOME, SELF-CARE


Condition:  STABLE


Referrals:  


UNKNOWN PCP NAME (PCP)








SEAN CASTILLO Jr, MD


Follow up as soon as you can


Patient Instructions:  Abdominal Pain During Pregnancy, Easy-to-Read, Diet - 

Hyperemesis Gravidarum





Additional Instructions:  


You were evaluated emergency room for abdominal pain in pregnancy with vomiting.

 Kindly follow-up with your primary care doctor as well as your OB/GYN You also 

have  a simple cyst in your right kidney. Follow-up with your primary care 

doctor or urologist for this. Take the prescribed medications as ordered.


Scripts


Ondansetron Hcl (ZOFRAN) 4 Mg Tablet


1 TAB PO Q6HRS, #20 TAB


   Prov: KERRY KABA KENNA         19





Attending Signature


Attending Signature


I have reviewed the PA/NP's note and plan of care. I was available for 

consultation as needed during the patient's visit in the emergency department. I

 agree with the clinical impression, plan, and disposition.


 (AMBROSIO SCHROEDER DO)





Problem Qualifiers








   Primary Impression:  


   Abdominal pain in pregnancy


   Trimester:  first trimester  Qualified Codes:  O26.891 - Other specified 

   pregnancy related conditions, first trimester; R10.9 - Unspecified abdominal 

   pain








KERRY KABA KENNA               Sep 5, 2019 19:27


AMBROSIO SCHROEDER DO              Sep 7, 2019 04:57
2+ b/l, no femoral bruits b/l

## 2023-01-18 NOTE — PHYS DOC
Past Medical History


Past Medical History:  Migraines


Additional Past Medical Histor:  [place] palsy


Past Surgical History:  No Surgical History


Alcohol Use:  None


Drug Use:  Marijuana





Adult General


Chief Complaint


Chief Complaint:  HEADACHE





HPI


HPI


Patient is a 21  year old female who presents with headache. Patient reports 

for the last 10 days she has been having migraine headache that waxes and 

wanes. She describes a throbbing the top of her head right now. She says she 

has been having occasional migraines since she had a child 2-1/2 years ago. 

This headache is the same as what she has experienced in the past. She says the 

headache gets worse when she gets upset, but is also worse with noises. She is 

taking some naproxen temporary relief. She denies any numbness, weakness, 

changes in vision. In addition, patient reports she has been having some 

itching and swelling around her vagina. No dysuria. No bleeding.





Review of Systems


Review of Systems


Constitutional: Denies fever or chills 


Eyes: Denies change in visual acuity or eye pain 


HENT: Denies nasal congestion or sore throat 


Respiratory: Denies cough or shortness of breath 


Cardiovascular: Denies chest pain


GI: Denies abdominal pain, nausea, vomiting, bloody stools or diarrhea 


: Vaginal itching and swelling. Denies dysuria or hematuria 


Musculoskeletal: Denies back pain or joint pain 


Integument: Denies rash or skin lesions 


Neurologic: Headache. Denies focal weakness or sensory changes





Current Medications


Current Medications





 Current Medications








 Medications


  (Trade)  Dose


 Ordered  Sig/Devan  Start Time


 Stop Time Status Last Admin


Dose Admin


 


 Acetaminophen


  (Tylenol)  1,000 mg  1X  ONCE  1/10/17 12:30


 1/10/17 12:31 DC 1/10/17 13:13


1,000 MG


 


 Promethazine HCl


  (Phenergan)  12.5 mg  1X  ONCE  1/10/17 12:30


 1/10/17 12:31 DC 1/10/17 13:13


12.5 MG











Allergies


Allergies





 Allergies








Coded Allergies Type Severity Reaction Last Updated Verified


 


  No Known Drug Allergies    2/20/14 No











Physical Exam


Physical Exam


Constitutional: Well developed, well nourished, no acute distress, non-toxic 

appearance


HENT: Normocephalic, atraumatic, bilateral external ears normal


Eyes: PERRL, EOMI, conjunctiva normal, no discharge


Neck: Normal range of motion, no stridor


Cardiovascular: Heart rate normal, regular rhythm, no murmur 


Lungs & Thorax:  Bilateral breath sounds clear to auscultation 


Abdomen: Bowel sounds normal, soft, non-distended, no TTP


Pelvic: Moderate amount white discharge in vault, no CMT or adnexal tenderness, 

no skin lesions noted


Skin: Warm, dry, no erythema, no rash


Extremities: No obvious deformity, no edema


Neurologic: Alert and oriented X 3, GCS 15, CN II-XII grossly intact, strength 

intact and symmetrical throughout, sensation to light touch intact throughout, 

no dystaxia noted





Current Patient Data


Vital Signs





 Vital Signs








  Date Time  Temp Pulse Resp B/P Pulse Ox O2 Delivery O2 Flow Rate FiO2


 


1/10/17 13:12  88 14 133/66 98 Room Air  


 


1/10/17 10:55 98.6       





 98.6       








Lab Values





 Laboratory Tests








Test


  1/10/17


12:30


 


Urine Collection Type Unknown  


 


Urine Color Yellow  


 


Urine Clarity Clear  


 


Urine pH 6.5  


 


Urine Specific Gravity 1.020  


 


Urine Protein


  Negativemg/dL


(NEG-TRACE)


 


Urine Glucose (UA)


  Negativemg/dL


(NEG)


 


Urine Ketones (Stick)


  Negativemg/dL


(NEG)


 


Urine Blood


  Negative (NEG)


 


 


Urine Nitrite


  Negative (NEG)


 


 


Urine Bilirubin


  Negative (NEG)


 


 


Urine Urobilinogen Dipstick


  0.2mg/dL (0.2


mg/dL)


 


Urine Leukocyte Esterase


  Moderate (NEG)


 


 


Urine RBC 0/HPF (0-2)  


 


Urine WBC


  20-40/HPF


(0-4)


 


Urine Squamous Epithelial


Cells Few/LPF  


 


 


Urine Bacteria 0/HPF (0-FEW)  


 


Urine Mucus Marked/LPF  


 


Urine Trichomonas Present  


 


Urine Pregnancy Test


  Negative (NEG)


 








Microbiology


1/10/17 Wet Prep - Final, Complete


          





Microbiology


1/10/17 Wet Prep - Final, Complete





EKG


EKG


[]





Radiology/Procedures


Radiology/Procedures


[]





Course & Med Decision Making


Course & Med Decision Making


Pertinent Labs and Imaging studies reviewed. (See chart for details)


Patient is 21-year-old female who presents with headache and vaginal 

irritation. Headache apparent migraine, no concerning findings on physical 

exam. Has history of same. Will treat with oral medication. Pelvic exam 

performed, shows moderate white discharge. UA notable for Trichomonas in urine. 

Discussed results with patient. Will treat with Flagyl. Patient discharged home 

with prescription for Flagyl and headache meds, instructions for follow-up, 

return precautions.





Dragon Disclaimer


Dragon Disclaimer


This electronic medical record was generated, in whole or in part, using a 

voice recognition dictation system.





Departure


Departure


Impression:  


 Primary Impression:  


 Migraine


 Additional Impression:  


 Trichomonas infection


Disposition:  01 HOME, SELF-CARE


Condition:  STABLE


Referrals:  


NO PCP (PCP)








GLENROY BETANCOURT MD


Patient Instructions:  Migraine Headache, Trichomoniasis





Additional Instructions:


Thank you for allowing us to provide care today in the Emergency Department.


Take the provided medication as directed. Use caution as this medication can 

make you drowsy.


Schedule a follow up appointment with a neurologist using the provided contact 

information. 


Return promptly to the Emergency Department if you develop any new or 

concerning symptoms.


Scripts


Metronidazole (Flagyl)500 Mg Tablet1 Tab PO BID #14 TAB


   Prov:TARIK MCGUIRE MD         1/10/17


Butalb/Acetaminophen/Caffeine (Fioricet -40 Mg Capsule)1 Each Capsule1 

Each PO PRN Q4HRS PRN HEADACHE #15 CAP


   Prov:TARIK MCGUIRE MD         1/10/17





Problem Qualifiers








TARIK MCGUIRE MD Alexandre 10, 2017 12:18 Detail Level: Detailed Quality 110: Preventive Care And Screening: Influenza Immunization: Influenza Immunization Administered during Influenza season